# Patient Record
Sex: FEMALE | Race: WHITE | NOT HISPANIC OR LATINO
[De-identification: names, ages, dates, MRNs, and addresses within clinical notes are randomized per-mention and may not be internally consistent; named-entity substitution may affect disease eponyms.]

---

## 2017-11-30 ENCOUNTER — APPOINTMENT (OUTPATIENT)
Dept: PSYCHIATRY | Facility: CLINIC | Age: 22
End: 2017-11-30
Payer: COMMERCIAL

## 2017-11-30 PROCEDURE — 99205 OFFICE O/P NEW HI 60 MIN: CPT

## 2017-11-30 RX ORDER — CEPHALEXIN 250 MG/1
250 CAPSULE ORAL
Qty: 20 | Refills: 0 | Status: COMPLETED | COMMUNITY
Start: 2017-11-07

## 2017-11-30 RX ORDER — ESCITALOPRAM OXALATE 5 MG/1
5 TABLET ORAL
Qty: 30 | Refills: 0 | Status: COMPLETED | COMMUNITY
Start: 2017-06-26

## 2017-11-30 RX ORDER — ESCITALOPRAM OXALATE 10 MG/1
10 TABLET ORAL
Qty: 30 | Refills: 0 | Status: COMPLETED | COMMUNITY
Start: 2017-07-05

## 2017-12-13 ENCOUNTER — APPOINTMENT (OUTPATIENT)
Dept: PSYCHIATRY | Facility: CLINIC | Age: 22
End: 2017-12-13
Payer: COMMERCIAL

## 2017-12-13 PROCEDURE — 99214 OFFICE O/P EST MOD 30 MIN: CPT

## 2017-12-13 RX ORDER — GABAPENTIN 300 MG/1
300 CAPSULE ORAL
Qty: 120 | Refills: 0 | Status: COMPLETED | COMMUNITY
Start: 2017-10-27 | End: 2017-12-13

## 2017-12-27 ENCOUNTER — APPOINTMENT (OUTPATIENT)
Dept: PSYCHIATRY | Facility: CLINIC | Age: 22
End: 2017-12-27
Payer: COMMERCIAL

## 2017-12-27 PROCEDURE — 99214 OFFICE O/P EST MOD 30 MIN: CPT

## 2017-12-27 RX ORDER — MIRTAZAPINE 15 MG/1
15 TABLET, FILM COATED ORAL
Qty: 30 | Refills: 0 | Status: COMPLETED | COMMUNITY
Start: 2017-10-20

## 2017-12-27 RX ORDER — DULOXETINE HYDROCHLORIDE 20 MG/1
20 CAPSULE, DELAYED RELEASE PELLETS ORAL
Qty: 10 | Refills: 0 | Status: COMPLETED | COMMUNITY
Start: 2017-12-13 | End: 2017-12-27

## 2018-01-11 ENCOUNTER — APPOINTMENT (OUTPATIENT)
Dept: PSYCHIATRY | Facility: CLINIC | Age: 23
End: 2018-01-11
Payer: COMMERCIAL

## 2018-01-11 VITALS — DIASTOLIC BLOOD PRESSURE: 71 MMHG | HEART RATE: 90 BPM | SYSTOLIC BLOOD PRESSURE: 108 MMHG

## 2018-01-11 PROCEDURE — 99214 OFFICE O/P EST MOD 30 MIN: CPT

## 2018-01-29 ENCOUNTER — APPOINTMENT (OUTPATIENT)
Dept: PSYCHIATRY | Facility: CLINIC | Age: 23
End: 2018-01-29
Payer: COMMERCIAL

## 2018-01-29 VITALS — HEART RATE: 77 BPM | SYSTOLIC BLOOD PRESSURE: 111 MMHG | DIASTOLIC BLOOD PRESSURE: 77 MMHG

## 2018-01-29 PROCEDURE — 99214 OFFICE O/P EST MOD 30 MIN: CPT

## 2018-01-29 RX ORDER — DULOXETINE HYDROCHLORIDE 30 MG/1
30 CAPSULE, DELAYED RELEASE PELLETS ORAL
Qty: 15 | Refills: 0 | Status: COMPLETED | COMMUNITY
Start: 2017-12-13

## 2018-01-29 RX ORDER — MIRTAZAPINE 30 MG/1
30 TABLET, FILM COATED ORAL
Qty: 30 | Refills: 0 | Status: COMPLETED | COMMUNITY
Start: 2017-10-20 | End: 2018-01-29

## 2018-02-08 ENCOUNTER — APPOINTMENT (OUTPATIENT)
Dept: PSYCHIATRY | Facility: CLINIC | Age: 23
End: 2018-02-08
Payer: COMMERCIAL

## 2018-02-08 PROCEDURE — 99214 OFFICE O/P EST MOD 30 MIN: CPT

## 2018-03-15 ENCOUNTER — APPOINTMENT (OUTPATIENT)
Dept: PSYCHIATRY | Facility: CLINIC | Age: 23
End: 2018-03-15
Payer: COMMERCIAL

## 2018-03-15 PROCEDURE — 99214 OFFICE O/P EST MOD 30 MIN: CPT

## 2018-04-03 ENCOUNTER — APPOINTMENT (OUTPATIENT)
Dept: PSYCHIATRY | Facility: CLINIC | Age: 23
End: 2018-04-03
Payer: COMMERCIAL

## 2018-04-03 VITALS — SYSTOLIC BLOOD PRESSURE: 98 MMHG | DIASTOLIC BLOOD PRESSURE: 61 MMHG | HEART RATE: 96 BPM

## 2018-04-03 PROCEDURE — 99214 OFFICE O/P EST MOD 30 MIN: CPT

## 2018-05-29 ENCOUNTER — APPOINTMENT (OUTPATIENT)
Dept: PSYCHIATRY | Facility: CLINIC | Age: 23
End: 2018-05-29
Payer: COMMERCIAL

## 2018-05-29 PROCEDURE — 99214 OFFICE O/P EST MOD 30 MIN: CPT

## 2018-07-16 ENCOUNTER — APPOINTMENT (OUTPATIENT)
Dept: PSYCHIATRY | Facility: CLINIC | Age: 23
End: 2018-07-16

## 2018-09-30 ENCOUNTER — EMERGENCY (EMERGENCY)
Facility: HOSPITAL | Age: 23
LOS: 1 days | Discharge: HOME | End: 2018-09-30
Admitting: EMERGENCY MEDICINE
Payer: COMMERCIAL

## 2018-09-30 VITALS
HEART RATE: 101 BPM | RESPIRATION RATE: 16 BRPM | TEMPERATURE: 99 F | OXYGEN SATURATION: 100 % | SYSTOLIC BLOOD PRESSURE: 145 MMHG | DIASTOLIC BLOOD PRESSURE: 93 MMHG

## 2018-09-30 DIAGNOSIS — F41.9 ANXIETY DISORDER, UNSPECIFIED: ICD-10-CM

## 2018-09-30 DIAGNOSIS — F33.0 MAJOR DEPRESSIVE DISORDER, RECURRENT, MILD: ICD-10-CM

## 2018-09-30 DIAGNOSIS — R69 ILLNESS, UNSPECIFIED: ICD-10-CM

## 2018-09-30 PROCEDURE — 90792 PSYCH DIAG EVAL W/MED SRVCS: CPT

## 2018-09-30 PROCEDURE — 99282 EMERGENCY DEPT VISIT SF MDM: CPT

## 2018-09-30 NOTE — ED BEHAVIORAL HEALTH ASSESSMENT NOTE - DESCRIPTION
Calm, smiling and in no apparent distress    Vital Signs Last 24 Hrs  T(C): 37 (30 Sep 2018 13:32), Max: 37 (30 Sep 2018 13:32)  T(F): 98.6 (30 Sep 2018 13:32), Max: 98.6 (30 Sep 2018 13:32)  HR: 101 (30 Sep 2018 13:32) (101 - 101)  BP: 145/93 (30 Sep 2018 13:32) (145/93 - 145/93)  BP(mean): --  RR: 16 (30 Sep 2018 13:32) (16 - 16)  SpO2: 100% (30 Sep 2018 13:32) (100% - 100%) Denies Born in [place], lives with family, father  at age 14, attended college but had some financial difficulties and has not been in college in 1.5 years

## 2018-09-30 NOTE — ED BEHAVIORAL HEALTH ASSESSMENT NOTE - CASE SUMMARY
22 y/o  female, with prior history of Depression, Anxiety, Anorexia nervosa, with no past psychiatric admission, prior history of seziure with possible cause of lack of sleep per mother, seasonal allergies and Lexapro allergies, ni history of SIB, SI/HI/I/P, substance use of alcohol, cannabis and LSD with no known complication, who presents from home BIB by mother to be evaluated for depressive symptoms. Patient presents with ongoing depression, anxiety and chronic problems with sleep secondary to noncompliance with treatment. Patient has an outpatient provider in which she stopped attending and she had a relapse of her depressive mood and anxiety since she self-discontinued her medications as of May 2018. At this time, voluntary admission was considered by patient refused and patient does not meet criteria for involutionary admission. Since patient is not at harm to self or others, symptoms can be best addressed with a crisis clinic referral and outpatient care. Alcohol us is concerning as it appears that patient is self-medicating with alcohol and should also be addressed and including in treatment as alcohol use can worse depression.  Agree with the assessment and plan as outline in the NP note. Pt denied any active suicidal ideation, homicidal ideation, auditory/visual hallucinations, CAH, or manic sx. Pt reports that she feel safe for [facility] clinic f/u and pt mother also reported no safety concern regarding discharge. Pt educated the effects of substance use on her mood and risk of negative outcome with ongoing use. Pt also accepted substance tx referral. Pt also educated on the risk suddenly stopping meds w/o discussing it first with her provider. Pt expressed understanding and states that she will consult her MD in the future before making such self changes.

## 2018-09-30 NOTE — ED BEHAVIORAL HEALTH ASSESSMENT NOTE - HPI (INCLUDE ILLNESS QUALITY, SEVERITY, DURATION, TIMING, CONTEXT, MODIFYING FACTORS, ASSOCIATED SIGNS AND SYMPTOMS)
Individual is a 23-year-old-[place] female, who is domiciled with family, single, unemployed, with a self-reported psychiatric history of Depression, Anxiety and Anorexia; with no previous psychiatric inpatient hospitalizations; denies medical history, reports 1 prior episode of seizure for unknown cause; Allergies to Lexapro (vomit) and seasonal allergies; denies history of violence, denies current or history of suicidal/homicidal ideation/thoughts, intent or plan; denies legal history or arrests; prior history of cannabis use, alcohol use and LSD use; who presents to the emergency department BIB mother from home due to feelings of hopelessness.    Patient endorses feeling guilt, hopeless, shame, lack of motivation since Aug of this year triggered by past trauma and self-discontinuation of medications. She reports a chronic history  of inability to sleep, sleeps on average 2 hours with no known cause, reports appetite has been good, denies any changes in weight, denies worthlessness, denies active suicidal ideation but noted past statements of "I don't know how to live past this."     Future goals, plan as per patient? Individual is a 23-year-old-[place] female, who is domiciled with family, single, unemployed, with a self-reported psychiatric history of Depression, Anxiety and Anorexia; with no previous psychiatric inpatient hospitalizations; denies medical history, reports 1 prior episode of seizure for unknown cause; Allergies to Lexapro (vomit) and seasonal allergies; denies history of violence, denies current or history of suicidal/homicidal ideation/thoughts, intent or plan; denies legal history or arrests; prior history of cannabis use, alcohol use and LSD use; who presents to the emergency department BIB mother from home due to feelings of hopelessness.    Patient endorses feeling guilt, hopeless, shame, poor concentration and lack of motivation since Aug of this year triggered by past trauma and self-discontinuation of medications. She reports a chronic history  of inability to sleep, sleeps on average 2 hours with no known cause, reports appetite has been good, denies any changes in weight, denies worthlessness, denies active suicidal ideation but noted past statements of "I don't know how to live past this."     Per collateral from mother: She was recommended to bring patient to be evaluated in ED today by patients' therapist Gissell Gr for feelings of hopelessness. Mother denies any concern for safety in past or current  denying patient making any statement to harm self or others, reports that she has observed patient to be hopeless, "eats junk food" noted long history fo problems with sleep and feels that patient has been drinking alcohol to help with sleep. Mother believes depression of patient was triggered by the untimely death of her father when patient was 14.

## 2018-09-30 NOTE — ED BEHAVIORAL HEALTH ASSESSMENT NOTE - SAFETY PLAN DETAILS
Advised mother and patient on the risk of abruptly stopped medications and probable risk of a recurrence in mood and risks of side effects. May return to [facility] ED if a worsening of symptoms occur. May contact 911 if there is an increased risk of harm to self others.

## 2018-09-30 NOTE — ED ADULT NURSE NOTE - OBJECTIVE STATEMENT
Received pt in  pt bought in by mother c/o depression pt denies Si/Hi/AVh presently safety & comfort measures maintained eval on going.

## 2018-09-30 NOTE — ED BEHAVIORAL HEALTH ASSESSMENT NOTE - DESCRIPTION (FIRST USE, LAST USE, QUANTITY, FREQUENCY, DURATION)
history of cigarette use, denies chronic daily use Reports history starting at the age of 14, last use on 9/28/18. Drinks socially-2 cups of vodka First use at the age of 20, last use about 1 month ago. Denies recreational use First use at the age of 20, lase use Aug 2018. Denies daily use.

## 2018-09-30 NOTE — ED BEHAVIORAL HEALTH ASSESSMENT NOTE - RISK ASSESSMENT
Risk factors include psychiatric diagnoses, chronic hopelessness, unemployed, lack of social support, Impulsiveness, young adult    Protective factors include no suicide attempts, no violence history, supportive housing, no access to guns, supportive family, willingness to seek help, no active suicidal ideation, no homicidal ideation, reality testing, hopefulness for future.

## 2018-09-30 NOTE — ED PROVIDER NOTE - MEDICAL DECISION MAKING DETAILS
24 y/o F hx MDD, Anxiety D/O  Medical evaluation performed. There is no clinical evidence of intoxication or any acute medical problem requiring immediate intervention. Psychiatric consultation called, recommend following up with Outpatient Psychiatrist.  D/C home in company of mother.

## 2018-09-30 NOTE — ED PROVIDER NOTE - CARE PLAN
Principal Discharge DX:	MDD (major depressive disorder), recurrent episode, mild  Secondary Diagnosis:	Anxiety disorder, unspecified type

## 2018-09-30 NOTE — ED ADULT NURSE REASSESSMENT NOTE - NS ED NURSE REASSESS COMMENT FT1
pt calm & cooperative d/c by NP verbalizing understanding of d/c instructions pt denies Si/Hi/AVh presently.

## 2018-09-30 NOTE — ED BEHAVIORAL HEALTH ASSESSMENT NOTE - NS ED BHA MSE SPEECH RATE
Check here if all serologies below were negative, non-reactive or immune. Otherwise select appropriate status.
Normal

## 2018-09-30 NOTE — ED BEHAVIORAL HEALTH ASSESSMENT NOTE - OTHER PAST PSYCHIATRIC HISTORY (INCLUDE DETAILS REGARDING ONSET, COURSE OF ILLNESS, INPATIENT/OUTPATIENT TREATMENT)
Reports history of care with outpatient psychiatrist Dr. [phone] from Nov 2017-May 2018. Patient reports that she missed one follow-up appointment in May and never returned ongoing care due to patient attending school in [place]. Reports history of care with outpatient psychiatrist Dr. [phone] from Nov 2017-May 2018. Patient reports that she missed one follow-up appointment in May and never returned ongoing care due to patient attending school in [place].    7/09/2017- [facility]-Adverse reaction to Lexapro  1/25/18-Seizure  2/1/18-Seen by Dr. Batista neurologist  2/21/18-Had an EEG to rule out seizures with negative results for recurrent seizures  5/28/18- History of UTI and Low K+

## 2018-09-30 NOTE — ED BEHAVIORAL HEALTH ASSESSMENT NOTE - SUMMARY
24 y/o  female, with prior history of Depression, Anxiety, Anorexia nervosa, with no past psychiatric admission, prior history of seziure with possible cause of lack of sleep per mother, seasonal allergies and Lexapro allergies, ni history of SIB, SI/HI/I/P, substance use of alcohol, cannabis and LSD with no known complication, who presents from home BIB by mother to be evaluated for depressive symptoms.     Patient presents with ongoing depression, anxiety and chronic problems with sleep secondary to noncompliance with treatment. Patient has an outpatient provider in which she stopped attending and she had a relapse of her depressive mood and anxiety since she self-discontinued her medications as of May 2018. At this time, voluntary admission was considered by patient refused and patient does not meet criteria for involutionary admission. Since patient is not at harm to self or others, symptoms can be best addressed with a crisis clinic referral and outpatient care. Alcohol us is concerning as it appears that patient is self-medicating with alcohol and should also be addressed and including in treatment as alcohol use can worse depression.

## 2018-09-30 NOTE — ED PROVIDER NOTE - OBJECTIVE STATEMENT
24 y/o F hx MDD, Anxiety D/O BIB mother w c/o depressive symptoms, episodic anxiousness and  insomnia. Admits to stop all her medications several weeks ago.   States that she feels overwhelmed and functioning below her baseline.  No evidence of physical injuries, broken skin or deformities.  Denies falling, punching or kicking any objects. Denies SI/HI/AH/VH. Denies pain, SOB , fever, chills, chest/ abdominal discomfort.  Denies recent use of  alcohol or illicit drugs.  [facility] - 7/2018

## 2018-09-30 NOTE — ED BEHAVIORAL HEALTH ASSESSMENT NOTE - PAST PSYCHOTROPIC MEDICATION
Cymbalta-most helpful, Lexapro, Trazodone, Remeron, Neurontin Cymbalta 60mg-most helpful, Lexapro 5mg, Trazodone 100mg, Remeron 15mg, Neurontin 300 TID

## 2018-09-30 NOTE — ED BEHAVIORAL HEALTH ASSESSMENT NOTE - DIFFERENTIAL
MDD, recurrent with anxious distress vs Persistent depressive disorder with anxious distress, Alcohol use disorder, EMMA, Cluster B personality disorder (history of fear of abandonment)

## 2018-10-01 ENCOUNTER — APPOINTMENT (OUTPATIENT)
Dept: PSYCHIATRY | Facility: CLINIC | Age: 23
End: 2018-10-01
Payer: COMMERCIAL

## 2018-10-01 PROCEDURE — 99214 OFFICE O/P EST MOD 30 MIN: CPT

## 2018-10-01 RX ORDER — DULOXETINE HYDROCHLORIDE 60 MG/1
60 CAPSULE, DELAYED RELEASE PELLETS ORAL
Qty: 30 | Refills: 1 | Status: DISCONTINUED | COMMUNITY
Start: 2017-12-13 | End: 2018-10-01

## 2018-10-01 RX ORDER — TRAZODONE HYDROCHLORIDE 50 MG/1
50 TABLET ORAL
Qty: 60 | Refills: 1 | Status: DISCONTINUED | COMMUNITY
Start: 2018-01-29 | End: 2018-10-01

## 2018-10-01 RX ORDER — DULOXETINE HYDROCHLORIDE 30 MG/1
30 CAPSULE, DELAYED RELEASE PELLETS ORAL
Qty: 30 | Refills: 1 | Status: DISCONTINUED | COMMUNITY
Start: 2018-03-15 | End: 2018-10-01

## 2018-10-11 ENCOUNTER — APPOINTMENT (OUTPATIENT)
Dept: PSYCHIATRY | Facility: CLINIC | Age: 23
End: 2018-10-11
Payer: COMMERCIAL

## 2018-10-11 DIAGNOSIS — F39 UNSPECIFIED MOOD [AFFECTIVE] DISORDER: ICD-10-CM

## 2018-10-11 DIAGNOSIS — F32.9 MAJOR DEPRESSIVE DISORDER, SINGLE EPISODE, UNSPECIFIED: ICD-10-CM

## 2018-10-11 PROBLEM — F41.9 ANXIETY DISORDER, UNSPECIFIED: Chronic | Status: ACTIVE | Noted: 2018-09-30

## 2018-10-11 PROCEDURE — 99214 OFFICE O/P EST MOD 30 MIN: CPT

## 2018-10-12 PROBLEM — F39 MOOD DISORDER: Noted: 2018-10-01

## 2018-10-12 PROBLEM — F32.9 DEPRESSION: Noted: 2017-11-30

## 2018-11-05 ENCOUNTER — APPOINTMENT (OUTPATIENT)
Dept: PSYCHIATRY | Facility: CLINIC | Age: 23
End: 2018-11-05
Payer: COMMERCIAL

## 2018-11-05 PROCEDURE — 99214 OFFICE O/P EST MOD 30 MIN: CPT

## 2018-11-26 ENCOUNTER — APPOINTMENT (OUTPATIENT)
Dept: PSYCHIATRY | Facility: CLINIC | Age: 23
End: 2018-11-26
Payer: COMMERCIAL

## 2018-11-26 PROCEDURE — 99214 OFFICE O/P EST MOD 30 MIN: CPT

## 2018-11-26 RX ORDER — LAMOTRIGINE 25 MG/1
25 TABLET ORAL
Qty: 21 | Refills: 0 | Status: COMPLETED | COMMUNITY
Start: 2018-10-11 | End: 2018-11-26

## 2018-11-26 RX ORDER — LAMOTRIGINE 150 MG/1
150 TABLET ORAL DAILY
Qty: 30 | Refills: 0 | Status: ACTIVE | COMMUNITY
Start: 2018-11-05 | End: 1900-01-01

## 2018-12-03 ENCOUNTER — CHART COPY (OUTPATIENT)
Age: 23
End: 2018-12-03

## 2018-12-04 ENCOUNTER — APPOINTMENT (OUTPATIENT)
Dept: PSYCHIATRY | Facility: CLINIC | Age: 23
End: 2018-12-04
Payer: COMMERCIAL

## 2018-12-04 PROCEDURE — 99214 OFFICE O/P EST MOD 30 MIN: CPT

## 2018-12-04 RX ORDER — QUETIAPINE FUMARATE 25 MG/1
25 TABLET ORAL
Qty: 30 | Refills: 1 | Status: DISCONTINUED | COMMUNITY
Start: 2018-10-01 | End: 2018-12-04

## 2018-12-04 RX ORDER — LURASIDONE HYDROCHLORIDE 20 MG/1
20 TABLET, FILM COATED ORAL
Qty: 30 | Refills: 0 | Status: ACTIVE | COMMUNITY
Start: 2018-12-04 | End: 1900-01-01

## 2018-12-10 ENCOUNTER — APPOINTMENT (OUTPATIENT)
Dept: PSYCHIATRY | Facility: CLINIC | Age: 23
End: 2018-12-10
Payer: COMMERCIAL

## 2018-12-10 PROCEDURE — 99214 OFFICE O/P EST MOD 30 MIN: CPT

## 2018-12-12 NOTE — ED ADULT NURSE NOTE - CHIEF COMPLAINT
Problem: Falls - Risk of:  Goal: Will remain free from falls  Will remain free from falls   Outcome: Met This Shift  Patient free of falls this shift. All precautions in place. Nonskid socks for prevention. Bed in lowest/locked position with alarm on. Call light and bedside table within easy reach. Patient instructed on use of the call light and fall precautions. Hourly rounding for safety. Will monitor. Problem: Safety:  Goal: Ability to remain free from injury will improve  Ability to remain free from injury will improve   Outcome: Met This Shift  Patient free of seizures this shift. Seizure pads to beds rails, avasys in use. Patient is on PO lamictal.  Will continue with seizure precautions. Problem: Pain:  Goal: Control of chronic pain  Control of chronic pain   Outcome: Met This Shift  PRN percocet given for foot pain. No adverse effects noted, patient satisfied, will monitor. The patient is a 23y Female complaining of

## 2018-12-18 ENCOUNTER — APPOINTMENT (OUTPATIENT)
Dept: PSYCHIATRY | Facility: CLINIC | Age: 23
End: 2018-12-18
Payer: COMMERCIAL

## 2018-12-18 DIAGNOSIS — G47.00 INSOMNIA, UNSPECIFIED: ICD-10-CM

## 2018-12-18 DIAGNOSIS — F31.81 BIPOLAR II DISORDER: ICD-10-CM

## 2018-12-18 DIAGNOSIS — F41.1 GENERALIZED ANXIETY DISORDER: ICD-10-CM

## 2018-12-18 PROCEDURE — 99214 OFFICE O/P EST MOD 30 MIN: CPT

## 2018-12-19 ENCOUNTER — OUTPATIENT (OUTPATIENT)
Dept: OUTPATIENT SERVICES | Facility: HOSPITAL | Age: 23
LOS: 1 days | Discharge: PSYCHIATRIC OR SUBSTANCE USE FACILITY | End: 2018-12-19
Payer: COMMERCIAL

## 2018-12-20 LAB
ALBUMIN SERPL ELPH-MCNC: 4.2 G/DL — SIGNIFICANT CHANGE UP (ref 3.3–5)
ALP SERPL-CCNC: 108 U/L — SIGNIFICANT CHANGE UP (ref 40–120)
ALT FLD-CCNC: 17 U/L — SIGNIFICANT CHANGE UP (ref 4–33)
AST SERPL-CCNC: 28 U/L — SIGNIFICANT CHANGE UP (ref 4–32)
BASOPHILS # BLD AUTO: 0.1 K/UL — SIGNIFICANT CHANGE UP (ref 0–0.2)
BASOPHILS NFR BLD AUTO: 1 % — SIGNIFICANT CHANGE UP (ref 0–2)
BILIRUB SERPL-MCNC: 0.7 MG/DL — SIGNIFICANT CHANGE UP (ref 0.2–1.2)
BUN SERPL-MCNC: 8 MG/DL — SIGNIFICANT CHANGE UP (ref 7–23)
CALCIUM SERPL-MCNC: 8.9 MG/DL — SIGNIFICANT CHANGE UP (ref 8.4–10.5)
CHLORIDE SERPL-SCNC: 101 MMOL/L — SIGNIFICANT CHANGE UP (ref 98–107)
CHOLEST SERPL-MCNC: 159 MG/DL — SIGNIFICANT CHANGE UP (ref 120–199)
CO2 SERPL-SCNC: 24 MMOL/L — SIGNIFICANT CHANGE UP (ref 22–31)
CREAT SERPL-MCNC: 0.66 MG/DL — SIGNIFICANT CHANGE UP (ref 0.5–1.3)
EOSINOPHIL # BLD AUTO: 0.1 K/UL — SIGNIFICANT CHANGE UP (ref 0–0.5)
EOSINOPHIL NFR BLD AUTO: 1 % — SIGNIFICANT CHANGE UP (ref 0–6)
GLUCOSE SERPL-MCNC: 81 MG/DL — SIGNIFICANT CHANGE UP (ref 70–99)
HBA1C BLD-MCNC: 4.2 % — SIGNIFICANT CHANGE UP (ref 4–5.6)
HCG SERPL-ACNC: < 5 MIU/ML — SIGNIFICANT CHANGE UP
HCT VFR BLD CALC: 45.7 % — HIGH (ref 34.5–45)
HDLC SERPL-MCNC: 72 MG/DL — HIGH (ref 45–65)
HGB BLD-MCNC: 15.6 G/DL — HIGH (ref 11.5–15.5)
IMM GRANULOCYTES # BLD AUTO: 0.04 # — SIGNIFICANT CHANGE UP
IMM GRANULOCYTES NFR BLD AUTO: 0.4 % — SIGNIFICANT CHANGE UP (ref 0–1.5)
LIPID PNL WITH DIRECT LDL SERPL: 84 MG/DL — SIGNIFICANT CHANGE UP
LYMPHOCYTES # BLD AUTO: 1.34 K/UL — SIGNIFICANT CHANGE UP (ref 1–3.3)
LYMPHOCYTES # BLD AUTO: 13.7 % — SIGNIFICANT CHANGE UP (ref 13–44)
MCHC RBC-ENTMCNC: 34.1 % — SIGNIFICANT CHANGE UP (ref 32–36)
MCHC RBC-ENTMCNC: 35.1 PG — HIGH (ref 27–34)
MCV RBC AUTO: 102.9 FL — HIGH (ref 80–100)
MONOCYTES # BLD AUTO: 0.41 K/UL — SIGNIFICANT CHANGE UP (ref 0–0.9)
MONOCYTES NFR BLD AUTO: 4.2 % — SIGNIFICANT CHANGE UP (ref 2–14)
NEUTROPHILS # BLD AUTO: 7.79 K/UL — HIGH (ref 1.8–7.4)
NEUTROPHILS NFR BLD AUTO: 79.7 % — HIGH (ref 43–77)
NRBC # FLD: 0 — SIGNIFICANT CHANGE UP
PLATELET # BLD AUTO: 257 K/UL — SIGNIFICANT CHANGE UP (ref 150–400)
PMV BLD: 10.5 FL — SIGNIFICANT CHANGE UP (ref 7–13)
POTASSIUM SERPL-MCNC: 3.7 MMOL/L — SIGNIFICANT CHANGE UP (ref 3.5–5.3)
POTASSIUM SERPL-SCNC: 3.7 MMOL/L — SIGNIFICANT CHANGE UP (ref 3.5–5.3)
PROT SERPL-MCNC: 7.5 G/DL — SIGNIFICANT CHANGE UP (ref 6–8.3)
RBC # BLD: 4.44 M/UL — SIGNIFICANT CHANGE UP (ref 3.8–5.2)
RBC # FLD: 12.9 % — SIGNIFICANT CHANGE UP (ref 10.3–14.5)
SODIUM SERPL-SCNC: 139 MMOL/L — SIGNIFICANT CHANGE UP (ref 135–145)
TRIGL SERPL-MCNC: 102 MG/DL — SIGNIFICANT CHANGE UP (ref 10–149)
TSH SERPL-MCNC: 1.07 UIU/ML — SIGNIFICANT CHANGE UP (ref 0.27–4.2)
WBC # BLD: 9.78 K/UL — SIGNIFICANT CHANGE UP (ref 3.8–10.5)
WBC # FLD AUTO: 9.78 K/UL — SIGNIFICANT CHANGE UP (ref 3.8–10.5)

## 2018-12-24 LAB
APPEARANCE UR: SIGNIFICANT CHANGE UP
BACTERIA # UR AUTO: SIGNIFICANT CHANGE UP
BASOPHILS # BLD AUTO: 0.07 K/UL — SIGNIFICANT CHANGE UP (ref 0–0.2)
BASOPHILS NFR BLD AUTO: 1 % — SIGNIFICANT CHANGE UP (ref 0–2)
BILIRUB UR-MCNC: NEGATIVE — SIGNIFICANT CHANGE UP
BLOOD UR QL VISUAL: SIGNIFICANT CHANGE UP
COLOR SPEC: YELLOW — SIGNIFICANT CHANGE UP
EOSINOPHIL # BLD AUTO: 0.06 K/UL — SIGNIFICANT CHANGE UP (ref 0–0.5)
EOSINOPHIL NFR BLD AUTO: 0.9 % — SIGNIFICANT CHANGE UP (ref 0–6)
GLUCOSE UR-MCNC: NEGATIVE — SIGNIFICANT CHANGE UP
HCG SERPL-ACNC: < 5 MIU/ML — SIGNIFICANT CHANGE UP
HCT VFR BLD CALC: 44.6 % — SIGNIFICANT CHANGE UP (ref 34.5–45)
HGB BLD-MCNC: 15.4 G/DL — SIGNIFICANT CHANGE UP (ref 11.5–15.5)
IMM GRANULOCYTES # BLD AUTO: 0.05 # — SIGNIFICANT CHANGE UP
IMM GRANULOCYTES NFR BLD AUTO: 0.7 % — SIGNIFICANT CHANGE UP (ref 0–1.5)
KETONES UR-MCNC: NEGATIVE — SIGNIFICANT CHANGE UP
LEUKOCYTE ESTERASE UR-ACNC: SIGNIFICANT CHANGE UP
LYMPHOCYTES # BLD AUTO: 1.1 K/UL — SIGNIFICANT CHANGE UP (ref 1–3.3)
LYMPHOCYTES # BLD AUTO: 16.2 % — SIGNIFICANT CHANGE UP (ref 13–44)
MCHC RBC-ENTMCNC: 34.5 % — SIGNIFICANT CHANGE UP (ref 32–36)
MCHC RBC-ENTMCNC: 34.5 PG — HIGH (ref 27–34)
MCV RBC AUTO: 100 FL — SIGNIFICANT CHANGE UP (ref 80–100)
MONOCYTES # BLD AUTO: 0.47 K/UL — SIGNIFICANT CHANGE UP (ref 0–0.9)
MONOCYTES NFR BLD AUTO: 6.9 % — SIGNIFICANT CHANGE UP (ref 2–14)
NEUTROPHILS # BLD AUTO: 5.03 K/UL — SIGNIFICANT CHANGE UP (ref 1.8–7.4)
NEUTROPHILS NFR BLD AUTO: 74.3 % — SIGNIFICANT CHANGE UP (ref 43–77)
NITRITE UR-MCNC: NEGATIVE — SIGNIFICANT CHANGE UP
NRBC # FLD: 0 — SIGNIFICANT CHANGE UP
PH UR: 6.5 — SIGNIFICANT CHANGE UP (ref 5–8)
PLATELET # BLD AUTO: 253 K/UL — SIGNIFICANT CHANGE UP (ref 150–400)
PMV BLD: 10.6 FL — SIGNIFICANT CHANGE UP (ref 7–13)
PROT UR-MCNC: 20 — SIGNIFICANT CHANGE UP
RBC # BLD: 4.46 M/UL — SIGNIFICANT CHANGE UP (ref 3.8–5.2)
RBC # FLD: 13.1 % — SIGNIFICANT CHANGE UP (ref 10.3–14.5)
RBC CASTS # UR COMP ASSIST: SIGNIFICANT CHANGE UP (ref 0–?)
SP GR SPEC: 1.01 — SIGNIFICANT CHANGE UP (ref 1–1.04)
SQUAMOUS # UR AUTO: SIGNIFICANT CHANGE UP
UROBILINOGEN FLD QL: NORMAL — SIGNIFICANT CHANGE UP
WBC # BLD: 6.78 K/UL — SIGNIFICANT CHANGE UP (ref 3.8–10.5)
WBC # FLD AUTO: 6.78 K/UL — SIGNIFICANT CHANGE UP (ref 3.8–10.5)
WBC UR QL: >50 — HIGH (ref 0–?)

## 2018-12-27 DIAGNOSIS — F31.81 BIPOLAR II DISORDER: ICD-10-CM

## 2018-12-28 LAB
AMPHET UR-MCNC: NEGATIVE — SIGNIFICANT CHANGE UP
BARBITURATES UR SCN-MCNC: NEGATIVE — SIGNIFICANT CHANGE UP
BENZODIAZ UR-MCNC: NEGATIVE — SIGNIFICANT CHANGE UP
CANNABINOIDS UR-MCNC: POSITIVE — SIGNIFICANT CHANGE UP
COCAINE METAB.OTHER UR-MCNC: NEGATIVE — SIGNIFICANT CHANGE UP
METHADONE UR-MCNC: NEGATIVE — SIGNIFICANT CHANGE UP
OPIATES UR-MCNC: NEGATIVE — SIGNIFICANT CHANGE UP
OXYCODONE UR-MCNC: NEGATIVE — SIGNIFICANT CHANGE UP
PCP UR-MCNC: NEGATIVE — SIGNIFICANT CHANGE UP

## 2019-01-16 ENCOUNTER — OUTPATIENT (OUTPATIENT)
Dept: OUTPATIENT SERVICES | Facility: HOSPITAL | Age: 24
LOS: 1 days | Discharge: PSYCHIATRIC OR SUBSTANCE USE FACILITY | End: 2019-01-16
Payer: COMMERCIAL

## 2019-01-17 DIAGNOSIS — F41.1 GENERALIZED ANXIETY DISORDER: ICD-10-CM

## 2019-01-17 DIAGNOSIS — F31.81 BIPOLAR II DISORDER: ICD-10-CM

## 2019-01-17 DIAGNOSIS — F60.3 BORDERLINE PERSONALITY DISORDER: ICD-10-CM

## 2020-08-20 ENCOUNTER — TRANSCRIPTION ENCOUNTER (OUTPATIENT)
Age: 25
End: 2020-08-20

## 2021-01-28 PROCEDURE — 99442: CPT

## 2021-03-01 PROCEDURE — 99443: CPT

## 2021-03-26 PROCEDURE — 99442: CPT

## 2021-04-23 PROCEDURE — 99442: CPT

## 2021-05-21 PROCEDURE — 99442: CPT

## 2021-06-22 PROCEDURE — 99214 OFFICE O/P EST MOD 30 MIN: CPT | Mod: 95

## 2021-06-24 ENCOUNTER — TRANSCRIPTION ENCOUNTER (OUTPATIENT)
Age: 26
End: 2021-06-24

## 2021-07-26 PROCEDURE — 99214 OFFICE O/P EST MOD 30 MIN: CPT | Mod: 95

## 2021-09-10 PROCEDURE — 99214 OFFICE O/P EST MOD 30 MIN: CPT | Mod: 95

## 2021-10-08 PROCEDURE — 99214 OFFICE O/P EST MOD 30 MIN: CPT | Mod: 95

## 2021-11-05 PROCEDURE — 99214 OFFICE O/P EST MOD 30 MIN: CPT | Mod: 95

## 2021-11-29 PROCEDURE — 99214 OFFICE O/P EST MOD 30 MIN: CPT | Mod: 95

## 2021-12-01 ENCOUNTER — OUTPATIENT (OUTPATIENT)
Dept: OUTPATIENT SERVICES | Facility: HOSPITAL | Age: 26
LOS: 1 days | Discharge: HOME | End: 2021-12-01
Payer: COMMERCIAL

## 2021-12-13 PROCEDURE — 99214 OFFICE O/P EST MOD 30 MIN: CPT

## 2021-12-21 PROCEDURE — 99214 OFFICE O/P EST MOD 30 MIN: CPT

## 2021-12-28 PROCEDURE — 99214 OFFICE O/P EST MOD 30 MIN: CPT

## 2022-01-05 PROCEDURE — 99214 OFFICE O/P EST MOD 30 MIN: CPT | Mod: 95

## 2022-01-06 DIAGNOSIS — F50.9 EATING DISORDER, UNSPECIFIED: ICD-10-CM

## 2022-01-06 DIAGNOSIS — F60.3 BORDERLINE PERSONALITY DISORDER: ICD-10-CM

## 2022-01-06 DIAGNOSIS — F33.9 MAJOR DEPRESSIVE DISORDER, RECURRENT, UNSPECIFIED: ICD-10-CM

## 2022-01-11 PROCEDURE — 99214 OFFICE O/P EST MOD 30 MIN: CPT

## 2022-01-18 ENCOUNTER — OUTPATIENT (OUTPATIENT)
Dept: OUTPATIENT SERVICES | Facility: HOSPITAL | Age: 27
LOS: 1 days | Discharge: LEFT BEFORE DISCHARGE | End: 2022-01-18
Payer: COMMERCIAL

## 2022-01-19 PROCEDURE — 90791 PSYCH DIAGNOSTIC EVALUATION: CPT | Mod: 95

## 2022-02-16 DIAGNOSIS — F60.3 BORDERLINE PERSONALITY DISORDER: ICD-10-CM

## 2022-02-16 DIAGNOSIS — F50.9 EATING DISORDER, UNSPECIFIED: ICD-10-CM

## 2022-02-16 DIAGNOSIS — F41.1 GENERALIZED ANXIETY DISORDER: ICD-10-CM

## 2022-02-16 DIAGNOSIS — F33.9 MAJOR DEPRESSIVE DISORDER, RECURRENT, UNSPECIFIED: ICD-10-CM

## 2022-02-25 PROCEDURE — 99214 OFFICE O/P EST MOD 30 MIN: CPT | Mod: 95

## 2022-03-18 PROCEDURE — 99214 OFFICE O/P EST MOD 30 MIN: CPT | Mod: 95

## 2022-03-24 ENCOUNTER — EMERGENCY (EMERGENCY)
Facility: HOSPITAL | Age: 27
LOS: 1 days | Discharge: HOME | End: 2022-03-24
Attending: EMERGENCY MEDICINE | Admitting: EMERGENCY MEDICINE
Payer: COMMERCIAL

## 2022-03-24 VITALS
HEART RATE: 110 BPM | TEMPERATURE: 99 F | SYSTOLIC BLOOD PRESSURE: 127 MMHG | OXYGEN SATURATION: 99 % | RESPIRATION RATE: 18 BRPM | DIASTOLIC BLOOD PRESSURE: 90 MMHG

## 2022-03-24 DIAGNOSIS — F60.3 BORDERLINE PERSONALITY DISORDER: ICD-10-CM

## 2022-03-24 DIAGNOSIS — F32.9 MAJOR DEPRESSIVE DISORDER, SINGLE EPISODE, UNSPECIFIED: ICD-10-CM

## 2022-03-24 DIAGNOSIS — F10.10 ALCOHOL ABUSE, UNCOMPLICATED: ICD-10-CM

## 2022-03-24 PROCEDURE — 99284 EMERGENCY DEPT VISIT MOD MDM: CPT

## 2022-03-24 PROCEDURE — 90792 PSYCH DIAG EVAL W/MED SRVCS: CPT

## 2022-03-24 NOTE — ED PROVIDER NOTE - PATIENT PORTAL LINK FT
You can access the FollowMyHealth Patient Portal offered by [facility] by registering at the following website: [email or web address]. By joining [facility]’s FollowMyHealth portal, you will also be able to view your health information using other applications (apps) compatible with our system.

## 2022-03-24 NOTE — ED BEHAVIORAL HEALTH ASSESSMENT NOTE - OTHER PAST PSYCHIATRIC HISTORY (INCLUDE DETAILS REGARDING ONSET, COURSE OF ILLNESS, INPATIENT/OUTPATIENT TREATMENT)
Reports history of care with outpatient psychiatrist Dr. [phone] from Nov 2017-May 2018. Patient reports that she missed one follow-up appointment in May and never returned ongoing care due to patient attending school in [place].    7/09/2017- [facility]-Adverse reaction to Lexapro  1/25/18-Seizure  2/1/18-Seen by Dr. Batista neurologist  2/21/18-Had an EEG to rule out seizures with negative results for recurrent seizures  5/28/18- History of UTI and Low K+ psychiatric history of Depression, Anxiety, Borderline personality disorder and Anorexia- in . Outpatient PACE program with Dr. Mendez & Trina Maxwell     7/09/2017- [facility]-Adverse reaction to Lexapro  1/25/18-Seizure  2/1/18-Seen by Dr. Batista neurologist  2/21/18-Had an EEG to rule out seizures with negative results for recurrent seizures  5/28/18- History of UTI and Low K+

## 2022-03-24 NOTE — ED BEHAVIORAL HEALTH ASSESSMENT NOTE - SAFETY PLAN DISCUSSED WITH:
Appointment scheduled 2/10/22.  
Medication already filled in another encounter.   
Message left for Dad to call and schedule med check.   
Refill Request on:     Disp Refills Start End    buPROPion XL (WELLBUTRIN XL) 150 MG 24 hr tablet 30 tablet 3 9/9/2021     Sig - Route: Take 1 tablet by mouth daily. - Oral    Sent to pharmacy as: buPROPion HCl ER (XL) 150 MG Oral Tablet Extended Release 24 Hour (WELLBUTRIN XL)    Class: Eprescribe      Last OV: 9/9/21  Next OV: none  PDMP reviewed: no     Patient was supposed to follow up in December; please schedule med check prior to refilling medication.   
Patient/Family

## 2022-03-24 NOTE — ED BEHAVIORAL HEALTH ASSESSMENT NOTE - NSCURPASTPSYDX_PSY_ALL_CORE
Mood disorder/Alcohol/Substance Use disorders Mood disorder/Eating disorder/Alcohol/Substance Use disorders

## 2022-03-24 NOTE — ED ADULT TRIAGE NOTE - CHIEF COMPLAINT QUOTE
Pt brought in by EMS from work after telling her boss she was having " dark thoughts". Pt states she has chronic passive suicidal thoughts. Pt calm and cooperative in triage.

## 2022-03-24 NOTE — ED BEHAVIORAL HEALTH ASSESSMENT NOTE - HPI (INCLUDE ILLNESS QUALITY, SEVERITY, DURATION, TIMING, CONTEXT, MODIFYING FACTORS, ASSOCIATED SIGNS AND SYMPTOMS)
Individual is a 23-year-old-[place] female, who is domiciled with family, single, unemployed, with a self-reported psychiatric history of Depression, Anxiety and Anorexia; with no previous psychiatric inpatient hospitalizations; denies medical history, reports 1 prior episode of seizure for unknown cause; Allergies to Lexapro (vomit) and seasonal allergies; denies history of violence, denies current or history of suicidal/homicidal ideation/thoughts, intent or plan; denies legal history or arrests; prior history of cannabis use, alcohol use and LSD use; who presents to the emergency department BIB mother from home due to feelings of hopelessness.    Patient endorses feeling guilt, hopeless, shame, poor concentration and lack of motivation since Aug of this year triggered by past trauma and self-discontinuation of medications. She reports a chronic history  of inability to sleep, sleeps on average 2 hours with no known cause, reports appetite has been good, denies any changes in weight, denies worthlessness, denies active suicidal ideation but noted past statements of "I don't know how to live past this."     Per collateral from mother: She was recommended to bring patient to be evaluated in ED today by patients' therapist Gissell Gr for feelings of hopelessness. Mother denies any concern for safety in past or current  denying patient making any statement to harm self or others, reports that she has observed patient to be hopeless, "eats junk food" noted long history fo problems with sleep and feels that patient has been drinking alcohol to help with sleep. Mother believes depression of patient was triggered by the untimely death of her father when patient was 14. Patient is a 26-year-old[place] female, who is domiciled with family, single, employed, with a psychiatric history of Depression, Anxiety, Borderline personality disorder and Anorexia- in ; with no previous psychiatric inpatient hospitalizations, no history of suicide attempts. denies history of violence, denies legal history or arrests; prior history of cannabis use/LSD use, habitual alcohol use 3x weekly. No known history of detox/rehab/withdrawal symptoms. No PMH- reports 1 prior episode of seizure for unknown cause; Allergies to Lexapro (vomit) and seasonal allergies. BIBA referred by work colleagues for suicidal ideation.     Patient reports she came to the ED because she was having "dark thoughts." She described her dark thoughts as chronic passive suicidal ideation. Patient stated she always has thoughts about she doesn't know how to go on and that she haters herself. She adamantly denies active SI/plan/intent. Patient discussed an incident this past weekend when she was out drinking with friends and said some things she regretted. She reports she felt like her friends didn't like her. She cannot recall what she said exactly. She also discussed how she does not like her job and dreads going to it everyday. She stated it wasn't what she thought it was and doesn't feel she is helping people.     She stated since this incident had been having passive suicidal ideation, feeling lonely  and called her employee assistance line, a suicide hotline and her mother. She stated she called these people because she felt lonely and wanted someone to talk to. She denied active SI/plan/intent. She said she just wanted to speak to someone. Yesterday her boss let her work from home after her therapy appt. Today she was at work and she was speaking to her boss telling her how she felt and was speaking to her work colleagues. She stated one of her colleagues said that she would worry if she didn't let her call 911. EMS came and patient spoke to her therapist and EMS said she had to go to ED despite speaking with therapist.    Patient endorsed chronic depression, feelings of worthlessness, anxiety and passive suicidal ideation. She showers 2x weekly  since september because she reports showering isn't distracting enough and she does other things to distract herself. She endorses trigger to worsening depressive symptoms including relationship breakup in September after dating 1 year, disliking her job and intermittent relationship issues.     Patient denies any hallucinations, does not report any delusional thought content, denies thought insertion/withdrawal, denies referential thought processes & is not paranoid on interview. Pt is linear, logical, organized and well related. Patient does not report nor exhibit any signs of shannan, including irritable or elevated mood, grandiosity, pressured speech, risk-taking behaviors, increase in productivity or agitation. Patient denies sleep disturbances, preoccupation with death or feelings of guilt. Patient adamantly denies active SI, intent or plan; denies any HI, violent thoughts.     See  note for collateral information. Patient is a 26-year-old- female, who is domiciled with family, single, employed, with a psychiatric history of Depression, Anxiety, Borderline personality disorder and Anorexia- in ; with no previous psychiatric inpatient hospitalizations, no history of suicide attempts. denies history of violence, denies legal history or arrests; prior history of cannabis use/LSD use, habitual alcohol use 3x weekly. No known history of detox/rehab/withdrawal symptoms. No PMH- reports 1 prior episode of seizure for unknown cause; Allergies to Lexapro (vomit) and seasonal allergies. BIBA referred by work colleagues for suicidal ideation.     Patient reports she came to the ED because she was having "dark thoughts." She described her dark thoughts as chronic passive suicidal ideation. Patient stated she always has thoughts about she doesn't know how to go on and that she hates herself. She adamantly denies active SI/plan/intent. Patient discussed an incident this past weekend when she was out drinking with friends and said some things she regretted. She reports she felt like her friends didn't like her. She cannot recall what she said exactly. She also discussed how she does not like her job and dreads going to it everyday. She stated it wasn't what she thought it was and doesn't feel she is helping people.     She stated since this incident had been having passive suicidal ideation, feeling lonely  and called her employee assistance line, a suicide hotline and her mother. She stated she called these people because she felt lonely and wanted someone to talk to. She denied active SI/plan/intent. She said she just wanted to speak to someone. Yesterday her boss let her work from home after her therapy appt. Today she was at work and she was speaking to her boss telling her how she felt and was speaking to her work colleagues. She stated one of her colleagues said that she would worry if she didn't let her call 911. EMS came and patient spoke to her therapist and EMS said she had to go to ED despite speaking with therapist.    Patient endorsed chronic depression, feelings of worthlessness, anxiety and passive suicidal ideation. She showers 2x weekly  since september because she reports showering isn't distracting enough and she does other things to distract herself. She endorses trigger to worsening depressive symptoms including relationship breakup in September after dating 1 year, disliking her job and intermittent relationship issues.     Patient denies any hallucinations, does not report any delusional thought content, denies thought insertion/withdrawal, denies referential thought processes & is not paranoid on interview. Pt is linear, logical, organized and well related. Patient does not report nor exhibit any signs of shannan, including irritable or elevated mood, grandiosity, pressured speech, risk-taking behaviors, increase in productivity or agitation. Patient denies sleep disturbances, preoccupation with death or feelings of guilt. Patient adamantly denies active SI, intent or plan; denies any HI, violent thoughts.     See  note for collateral information.

## 2022-03-24 NOTE — ED PROVIDER NOTE - OBJECTIVE STATEMENT
26F h/o depression/anxiety sent in by her boss for expressing suicidal thoughts.  Pt endorses passive SI but denies active plans.  ROS otherwise negative.

## 2022-03-24 NOTE — ED BEHAVIORAL HEALTH ASSESSMENT NOTE - VIOLENCE PROTECTIVE FACTORS:
Residential stability/Relationship stability/Employment stability/Engagement in treatment/Insight into violence risk and need for management/treatment/Good treatment response/compliance

## 2022-03-24 NOTE — ED BEHAVIORAL HEALTH ASSESSMENT NOTE - SAFETY PLAN ADDT'L DETAILS
Safety plan discussed with.../Education provided regarding environmental safety / lethal means restriction/Provision of National Suicide Prevention Lifeline 0-235-916-EOQF (6737)

## 2022-03-24 NOTE — ED BEHAVIORAL HEALTH ASSESSMENT NOTE - CURRENT MEDICATION
None Abilify 30mg QD, Klonopin 0.5mg BID, Lamictal 200mg BID, Trazodone 200mg QHS, Wellbutrin XL 450mg Daily, Zoloft 200mg daily

## 2022-03-24 NOTE — ED PROVIDER NOTE - NSFOLLOWUPINSTRUCTIONS_ED_ALL_ED_FT
Please follow up with your psychiatrist.     If symptoms worsen, please return to the emergency department.

## 2022-03-24 NOTE — ED BEHAVIORAL HEALTH ASSESSMENT NOTE - CASE SUMMARY
Patient is a 26-year-old- female, who is domiciled with family, single, employed, with a psychiatric history of Depression, Anxiety, Borderline personality disorder and Anorexia- in ; with no previous psychiatric inpatient hospitalizations, no history of suicide attempts, denies history of violence, denies legal history or arrests; prior history of cannabis use/LSD use, habitual alcohol use 3x weekly. No known history of detox/rehab/withdrawal symptoms. No PMH- reports 1 prior episode of seizure for unknown cause; Allergies to Lexapro (vomit) and seasonal allergies. BIBA referred by work colleagues for suicidal ideation.    Patient endorses persistent and chronic mood symptoms.  Patient denies acute symptoms of depression, shannan, anxiety, psychosis, suicidal/homicidal ideations, intent or plans, denies auditory/visual hallucinations.  Patient does not represent an imminent threat of danger to self or others at this time.  Patient refused voluntary admission.  Patient does not meet criteria for inpatient involuntary hospitalization.  Patient will be discharged home and agrees to discharge disposition.  No acute safety concerns.

## 2022-03-24 NOTE — ED ADULT NURSE NOTE - OBJECTIVE STATEMENT
Pt brought in by EMS from work after telling her boss she was having " dark thoughts". Pt states she has chronic passive suicidal thoughts. Pt calm and cooperative in triage.  Patient to behavior health area. Pt evaluated by medicine and is being evaluated by psychiatry. Waiting for further orders and disposition.   EMMA Elkins

## 2022-03-24 NOTE — ED BEHAVIORAL HEALTH NOTE - BEHAVIORAL HEALTH NOTE
Spoke with Trina Maxwell- Patient is enrolled in individual therapy and group therapy. Patient has chronic passive suicidal ideation but is able to keep self safe and safety plan. She has no imminent safety concerns. Patient is embarrassed regarding a situation that happened with friends and is having trouble facing the situation. She struggles with passive suicidal ideation on a daily basis- it is chronic.

## 2022-03-24 NOTE — ED BEHAVIORAL HEALTH ASSESSMENT NOTE - SUMMARY
22 y/o  female, with prior history of Depression, Anxiety, Anorexia nervosa, with no past psychiatric admission, prior history of seziure with possible cause of lack of sleep per mother, seasonal allergies and Lexapro allergies, ni history of SIB, SI/HI/I/P, substance use of alcohol, cannabis and LSD with no known complication, who presents from home BIB by mother to be evaluated for depressive symptoms.     Patient presents with ongoing depression, anxiety and chronic problems with sleep secondary to noncompliance with treatment. Patient has an outpatient provider in which she stopped attending and she had a relapse of her depressive mood and anxiety since she self-discontinued her medications as of May 2018. At this time, voluntary admission was considered by patient refused and patient does not meet criteria for involutionary admission. Since patient is not at harm to self or others, symptoms can be best addressed with a crisis clinic referral and outpatient care. Alcohol us is concerning as it appears that patient is self-medicating with alcohol and should also be addressed and including in treatment as alcohol use can worse depression. Patient is a 26-year-old-Caucassian female, who is domiciled with family, single, employed, with a psychiatric history of Depression, Anxiety, Borderline personality disorder and Anorexia- in ; with no previous psychiatric inpatient hospitalizations, no history of suicide attempts. denies history of violence, denies legal history or arrests; prior history of cannabis use/LSD use, habitual alcohol use 3x weekly. No known history of detox/rehab/withdrawal symptoms. No PMH- reports 1 prior episode of seizure for unknown cause; Allergies to Lexapro (vomit) and seasonal allergies. BIBA referred by work colleagues for suicidal ideation.     Patient presents to the ED in the context of suicidal ideation. Patient has chronic passive suicidal ideation secondary to BPD. She endorses recent relationship issue with friends leading to impaired distress tolerance related to BPD. Patient endorses chronic depression, anxiety & passive suicidal ideation. Patient is in outpatient tx. She is caring for self, working and sleeping/eating. She is not acutely psychotic, manic or hypomanic. She is future oriented and able to safety plan. She was offered and refused inpatient admission. She does not meet criteria for involuntary inpatient admission. Recommend discharge and Follow up with outpatient admission.     Extensive safety planning performed with patient and family. In addition to extensive discussion of safe places patient can go to, distraction techniques, coping skills, motivational interviewing and who patient can call in the event of crisis including various hotlines. Patient and family agreeing verbally to return patient to ER or call 911 if symptoms worsen or patient has urges to harm self or others. Patient is a 26-year-old- female, who is domiciled with family, single, employed, with a psychiatric history of Depression, Anxiety, Borderline personality disorder and Anorexia- in [facility]; with no previous psychiatric inpatient hospitalizations, no history of suicide attempts. denies history of violence, denies legal history or arrests; prior history of cannabis use/LSD use, habitual alcohol use 3x weekly. No known history of detox/rehab/withdrawal symptoms. No PMH- reports 1 prior episode of seizure for unknown cause; Allergies to Lexapro (vomit) and seasonal allergies. BIBA referred by work colleagues for suicidal ideation.     Patient presents to the ED in the context of suicidal ideation. Patient has chronic passive suicidal ideation secondary to BPD. She endorses recent relationship issue with friends leading to impaired distress tolerance related to BPD. Patient endorses chronic depression, anxiety & passive suicidal ideation. Patient is in outpatient tx. She is caring for self, working and sleeping/eating. She is not acutely psychotic, manic or hypomanic. She is future oriented and able to safety plan. She was offered and refused inpatient admission. She does not meet criteria for involuntary inpatient admission. Recommend discharge and Follow up with outpatient admission.     Extensive safety planning performed with patient and family. In addition to extensive discussion of safe places patient can go to, distraction techniques, coping skills, motivational interviewing and who patient can call in the event of crisis including various hotlines. Patient and family agreeing verbally to return patient to ER or call 911 if symptoms worsen or patient has urges to harm self or others.

## 2022-03-24 NOTE — ED BEHAVIORAL HEALTH ASSESSMENT NOTE - RISK ASSESSMENT
Risk factors include psychiatric diagnoses, chronic hopelessness, unemployed, lack of social support, Impulsiveness, young adult    Protective factors include no suicide attempts, no violence history, supportive housing, no access to guns, supportive family, willingness to seek help, no active suicidal ideation, no homicidal ideation, reality testing, hopefulness for future. Risk factors include psychiatric diagnoses, BPD, substance abuse    Protective factors include no suicide attempts, no violence history, supportive housing, no access to guns, supportive family, willingness to seek help, no inpatient admissions, no active suicidal ideation, no homicidal ideation, reality testing, hopefulness for future. Low Acute Suicide Risk

## 2022-03-24 NOTE — ED BEHAVIORAL HEALTH ASSESSMENT NOTE - DESCRIPTION
Born in [place], lives with family, father  at age 14, attended college but had some financial difficulties and has not been in college in 1.5 years Calm, smiling and in no apparent distress    Vital Signs Last 24 Hrs  T(C): 37 (30 Sep 2018 13:32), Max: 37 (30 Sep 2018 13:32)  T(F): 98.6 (30 Sep 2018 13:32), Max: 98.6 (30 Sep 2018 13:32)  HR: 101 (30 Sep 2018 13:32) (101 - 101)  BP: 145/93 (30 Sep 2018 13:32) (145/93 - 145/93)  BP(mean): --  RR: 16 (30 Sep 2018 13:32) (16 - 16)  SpO2: 100% (30 Sep 2018 13:32) (100% - 100%) Denies During course of ED visit patient was calm and cooperative. Patient was not aggressive or violent and did not require PRN medications.    ICU Vital Signs Last 24 Hrs  T(C): 37 (24 Mar 2022 11:34), Max: 37 (24 Mar 2022 11:34)  T(F): 98.6 (24 Mar 2022 11:34), Max: 98.6 (24 Mar 2022 11:34)  HR: 110 (24 Mar 2022 11:34) (110 - 110)  BP: 127/90 (24 Mar 2022 11:34) (127/90 - 127/90)  BP(mean): --  ABP: --  ABP(mean): --  RR: 18 (24 Mar 2022 11:34) (18 - 18)  SpO2: 99% (24 Mar 2022 11:34) (99% - 99%)

## 2022-03-24 NOTE — ED BEHAVIORAL HEALTH ASSESSMENT NOTE - DIFFERENTIAL
MDD, recurrent with anxious distress vs Persistent depressive disorder with anxious distress, Alcohol use disorder, EMMA, Cluster B personality disorder (history of fear of abandonment) MDD, recurrent with anxious distress vs Persistent depressive disorder with anxious distress, Alcohol use disorder

## 2022-03-24 NOTE — ED BEHAVIORAL HEALTH NOTE - BEHAVIORAL HEALTH NOTE
Worker called patient’s mother Rashmi Mathis ([phone]) for collateral information. All information is as per mother:    Patient is a 26 year old female, domiciled with mother, with a history of depression, employed at [employer],  bibems activated by her workplace for dark thoughts. Mother reports that the patient is presenting with depression and sadness since Monday. Mother is unsure if the patient is suicidal. She states that the patient has been in therapy for years and she has no certain trigger. Patient’s father passed away when she was 14 years old. Mother states that the patient is affiliated with [facility] and attends the pace program Monday and Wednesday with Liz. She states that the patient also sees a therapist, Trina. Mother states that prior to that the patient was at a partial program [facility] from December to January. Mother states that maybe the patient’s medication is not working. Patient has been compliant with medications. Mother states that maybe the virtual treatment is not working either. She states that the patient is prescribed   aripiprazole, Duloxetine, Trazadone, lamotrigine, and bupropion. She states that the patient has no past SA. She states that the patient has a history of cutting behaviors and would cut herself on her upper arm but has not done this in a year. Patient was at [facility] for a day because she kept throwing up. She states that the patient has issues with eating. She states that she is a retired  and has 3 locked guns that is in a locked safe. Patient has no access to this. She states that the patient is not violent or aggressive. She states that the patient drinks alcohol excessively and vapes tobacco and marijuana. Patient last drank Monday. Mother states that the patient showers twice a week at baseline and is not a big eater. Mother thinks that patient needs a change in medication or needs in person treatment. Worker called patient’s mother Rashmi Mathis ([phone]) for collateral information. All information is as per mother:    Patient is a 26 year old female, domiciled with mother, with a history of depression, employed at [employer],  bibems activated by her workplace for dark thoughts. Mother reports that the patient is presenting with depression and sadness since Monday. Mother is unsure if the patient is suicidal. She states that the patient has been in therapy for years and she has no certain trigger. Patient’s father passed away when she was 14 years old. Mother states that the patient is affiliated with [facility] and attends the pace program Monday and Wednesday with Liz. She states that the patient also sees a therapist, Trina. Mother states that prior to that the patient was at a partial program [facility] from December to January. Mother states that maybe the patient’s medication is not working. Patient has been compliant with medications. Mother states that maybe the virtual treatment is not working either. She states that the patient is prescribed   aripiprazole, Duloxetine, Trazadone, lamotrigine, and bupropion. She states that the patient has no past SA. She states that the patient has a history of cutting behaviors and would cut herself on her upper arm but has not done this in a year. Patient was at [facility] for a day because she kept throwing up. She states that the patient has issues with eating. She states that she is a retired  and has 3 locked guns that is in a locked safe. Patient has no access to this. She states that the patient is not violent or aggressive. She states that the patient drinks alcohol excessively and vapes tobacco and marijuana. Patient last drank Monday. Mother states that the patient showers twice a week at baseline and is not a big eater. Mother thinks that patient needs a change in medication or needs in person treatment.    Patient is cleared psychiatrically. Worker called patient's mother and informed of patient discharge. Worker encouraged patient's mother that she lock away any sharps or pills for patient access. Mother confirmed that her guns is locked away in a safe and patient has no access to this. Mother is picking up patient at 2:30pm.
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
## 2022-03-26 NOTE — ED BEHAVIORAL HEALTH NOTE - BEHAVIORAL HEALTH NOTE
High Risk Log f/u tel#: [phone]     SW spoke w/ Pt re: high risk log f/u. Pt states that she has BPD and works on DBT skills w/ her therapist Trina noted in chart. She states that her psychiatrist is difficult to engage with for her and she would like someone new and spoke w/ staff here on Friday to discuss the same. Pt states she is unsure she got everything she needed at ED because she doesn't believe she needs inpt but also feels outpt hasn't been working as well. She has engaged w/ partial programs in the past, but looks to seek further psychiatric f/u at this time. SW to advise previous SW staff who worked w/ pt to determine what the f/u with her was discussed, and will seek to update pt w/ community resources.    She states she practiced self-care yesterday, taking off from work and maintaining spirituality. SW expressed that there was literature that could also support this, and provided some information on the same. Pt was extremely receptive to reading up on spirituality & healing w/ her DBT treatment. She was pleasant in conversation and receptive to SW guidance and brief intervention. No si/hi in evidence or expressed verbally. Pt said "I'm safe, I'm fine." Spoke with pt; states Monday small amounts of urine, now normal amount and voiding every 30 mins.   Denies drinking lots of water.   Little bit of pelvic pressure.  She notes having symptoms before and states Dr. He informed cultures were negative.  She states she is open to abx, BUT does not really want to and does not want to \" deal with this (symptoms) over the weekend.\"    No blood, discharge, itchy, pain of any sort, or cloudy urine.    Routing to PCP, pls advise.   High Risk Log f/u tel#: [phone] ***further F/u needed****    SW spoke w/ Pt re: high risk log f/u. Pt states that she has BPD and works on DBT skills w/ her therapist Trina noted in chart. She states that her psychiatrist is difficult to engage with for her and she would like someone new and spoke w/ staff here on Friday to discuss the same. Pt states she is unsure she got everything she needed at ED because she doesn't believe she needs inpt but also feels outpt hasn't been working as well. She has engaged w/ partial programs in the past, but looks to seek further psychiatric f/u at this time. SW to advise previous SW staff who worked w/ pt to determine what the f/u with her was discussed, and will seek to update pt w/ community resources.    She states she practiced self-care yesterday, taking off from work and maintaining spirituality. SW expressed that there was literature that could also support this, and provided some information on the same. Pt was extremely receptive to reading up on spirituality & healing w/ her DBT treatment. She was pleasant in conversation and receptive to SW guidance and brief intervention. No si/hi in evidence or expressed verbally. Pt said "I'm safe, I'm fine."

## 2022-05-06 PROCEDURE — 90834 PSYTX W PT 45 MINUTES: CPT | Mod: 95

## 2022-05-10 PROCEDURE — 90834 PSYTX W PT 45 MINUTES: CPT | Mod: 95

## 2022-06-07 PROCEDURE — 90832 PSYTX W PT 30 MINUTES: CPT | Mod: 95

## 2023-08-05 ENCOUNTER — NON-APPOINTMENT (OUTPATIENT)
Age: 28
End: 2023-08-05

## 2023-08-09 ENCOUNTER — NON-APPOINTMENT (OUTPATIENT)
Age: 28
End: 2023-08-09

## 2023-08-12 ENCOUNTER — INPATIENT (INPATIENT)
Facility: HOSPITAL | Age: 28
LOS: 5 days | Discharge: HOME | End: 2023-08-18
Attending: PSYCHIATRY & NEUROLOGY | Admitting: PSYCHIATRY & NEUROLOGY
Payer: MEDICAID

## 2023-08-12 VITALS
OXYGEN SATURATION: 100 % | DIASTOLIC BLOOD PRESSURE: 77 MMHG | RESPIRATION RATE: 16 BRPM | SYSTOLIC BLOOD PRESSURE: 114 MMHG | HEART RATE: 66 BPM | TEMPERATURE: 97 F

## 2023-08-12 DIAGNOSIS — F22 DELUSIONAL DISORDERS: ICD-10-CM

## 2023-08-12 LAB
ALBUMIN SERPL ELPH-MCNC: 4.7 G/DL — SIGNIFICANT CHANGE UP (ref 3.3–5)
ALP SERPL-CCNC: 83 U/L — SIGNIFICANT CHANGE UP (ref 40–120)
ALT FLD-CCNC: 21 U/L — SIGNIFICANT CHANGE UP (ref 4–33)
AMPHET UR-MCNC: NEGATIVE — SIGNIFICANT CHANGE UP
ANION GAP SERPL CALC-SCNC: 15 MMOL/L — HIGH (ref 7–14)
APAP SERPL-MCNC: <10 UG/ML — LOW (ref 15–25)
APPEARANCE UR: ABNORMAL
AST SERPL-CCNC: 29 U/L — SIGNIFICANT CHANGE UP (ref 4–32)
B PERT DNA SPEC QL NAA+PROBE: SIGNIFICANT CHANGE UP
B PERT+PARAPERT DNA PNL SPEC NAA+PROBE: SIGNIFICANT CHANGE UP
BACTERIA # UR AUTO: ABNORMAL /HPF
BARBITURATES UR SCN-MCNC: NEGATIVE — SIGNIFICANT CHANGE UP
BASOPHILS # BLD AUTO: 0.08 K/UL — SIGNIFICANT CHANGE UP (ref 0–0.2)
BASOPHILS NFR BLD AUTO: 1 % — SIGNIFICANT CHANGE UP (ref 0–2)
BENZODIAZ UR-MCNC: NEGATIVE — SIGNIFICANT CHANGE UP
BILIRUB SERPL-MCNC: 0.8 MG/DL — SIGNIFICANT CHANGE UP (ref 0.2–1.2)
BILIRUB UR-MCNC: NEGATIVE — SIGNIFICANT CHANGE UP
BORDETELLA PARAPERTUSSIS (RAPRVP): SIGNIFICANT CHANGE UP
BUN SERPL-MCNC: 6 MG/DL — LOW (ref 7–23)
C PNEUM DNA SPEC QL NAA+PROBE: SIGNIFICANT CHANGE UP
CALCIUM SERPL-MCNC: 9.8 MG/DL — SIGNIFICANT CHANGE UP (ref 8.4–10.5)
CAST: 4 /LPF — SIGNIFICANT CHANGE UP (ref 0–4)
CHLORIDE SERPL-SCNC: 99 MMOL/L — SIGNIFICANT CHANGE UP (ref 98–107)
CO2 SERPL-SCNC: 25 MMOL/L — SIGNIFICANT CHANGE UP (ref 22–31)
COCAINE METAB.OTHER UR-MCNC: NEGATIVE — SIGNIFICANT CHANGE UP
COLOR SPEC: SIGNIFICANT CHANGE UP
CREAT SERPL-MCNC: 0.59 MG/DL — SIGNIFICANT CHANGE UP (ref 0.5–1.3)
CREATININE URINE RESULT, DAU: 343 MG/DL — SIGNIFICANT CHANGE UP
DIFF PNL FLD: NEGATIVE — SIGNIFICANT CHANGE UP
EGFR: 127 ML/MIN/1.73M2 — SIGNIFICANT CHANGE UP
EOSINOPHIL # BLD AUTO: 0.01 K/UL — SIGNIFICANT CHANGE UP (ref 0–0.5)
EOSINOPHIL NFR BLD AUTO: 0.1 % — SIGNIFICANT CHANGE UP (ref 0–6)
ETHANOL SERPL-MCNC: <10 MG/DL — SIGNIFICANT CHANGE UP
FLUAV SUBTYP SPEC NAA+PROBE: SIGNIFICANT CHANGE UP
FLUBV RNA SPEC QL NAA+PROBE: SIGNIFICANT CHANGE UP
GLUCOSE SERPL-MCNC: 107 MG/DL — HIGH (ref 70–99)
GLUCOSE UR QL: NEGATIVE MG/DL — SIGNIFICANT CHANGE UP
HADV DNA SPEC QL NAA+PROBE: SIGNIFICANT CHANGE UP
HCG SERPL-ACNC: <1 MIU/ML — SIGNIFICANT CHANGE UP
HCOV 229E RNA SPEC QL NAA+PROBE: SIGNIFICANT CHANGE UP
HCOV HKU1 RNA SPEC QL NAA+PROBE: SIGNIFICANT CHANGE UP
HCOV NL63 RNA SPEC QL NAA+PROBE: SIGNIFICANT CHANGE UP
HCOV OC43 RNA SPEC QL NAA+PROBE: SIGNIFICANT CHANGE UP
HCT VFR BLD CALC: 44.3 % — SIGNIFICANT CHANGE UP (ref 34.5–45)
HGB BLD-MCNC: 15.6 G/DL — HIGH (ref 11.5–15.5)
HMPV RNA SPEC QL NAA+PROBE: SIGNIFICANT CHANGE UP
HPIV1 RNA SPEC QL NAA+PROBE: SIGNIFICANT CHANGE UP
HPIV2 RNA SPEC QL NAA+PROBE: SIGNIFICANT CHANGE UP
HPIV3 RNA SPEC QL NAA+PROBE: SIGNIFICANT CHANGE UP
HPIV4 RNA SPEC QL NAA+PROBE: SIGNIFICANT CHANGE UP
IANC: 5.62 K/UL — SIGNIFICANT CHANGE UP (ref 1.8–7.4)
IMM GRANULOCYTES NFR BLD AUTO: 0.4 % — SIGNIFICANT CHANGE UP (ref 0–0.9)
KETONES UR-MCNC: 40 MG/DL
LEUKOCYTE ESTERASE UR-ACNC: ABNORMAL
LYMPHOCYTES # BLD AUTO: 1.85 K/UL — SIGNIFICANT CHANGE UP (ref 1–3.3)
LYMPHOCYTES # BLD AUTO: 22.8 % — SIGNIFICANT CHANGE UP (ref 13–44)
M PNEUMO DNA SPEC QL NAA+PROBE: SIGNIFICANT CHANGE UP
MCHC RBC-ENTMCNC: 32 PG — SIGNIFICANT CHANGE UP (ref 27–34)
MCHC RBC-ENTMCNC: 35.2 GM/DL — SIGNIFICANT CHANGE UP (ref 32–36)
MCV RBC AUTO: 90.8 FL — SIGNIFICANT CHANGE UP (ref 80–100)
METHADONE UR-MCNC: NEGATIVE — SIGNIFICANT CHANGE UP
MONOCYTES # BLD AUTO: 0.52 K/UL — SIGNIFICANT CHANGE UP (ref 0–0.9)
MONOCYTES NFR BLD AUTO: 6.4 % — SIGNIFICANT CHANGE UP (ref 2–14)
NEUTROPHILS # BLD AUTO: 5.62 K/UL — SIGNIFICANT CHANGE UP (ref 1.8–7.4)
NEUTROPHILS NFR BLD AUTO: 69.3 % — SIGNIFICANT CHANGE UP (ref 43–77)
NITRITE UR-MCNC: NEGATIVE — SIGNIFICANT CHANGE UP
NRBC # BLD: 0 /100 WBCS — SIGNIFICANT CHANGE UP (ref 0–0)
NRBC # FLD: 0 K/UL — SIGNIFICANT CHANGE UP (ref 0–0)
OPIATES UR-MCNC: NEGATIVE — SIGNIFICANT CHANGE UP
OXYCODONE UR-MCNC: NEGATIVE — SIGNIFICANT CHANGE UP
PCP SPEC-MCNC: SIGNIFICANT CHANGE UP
PCP UR-MCNC: NEGATIVE — SIGNIFICANT CHANGE UP
PH UR: 6 — SIGNIFICANT CHANGE UP (ref 5–8)
PLATELET # BLD AUTO: 374 K/UL — SIGNIFICANT CHANGE UP (ref 150–400)
POTASSIUM SERPL-MCNC: 3.1 MMOL/L — LOW (ref 3.5–5.3)
POTASSIUM SERPL-SCNC: 3.1 MMOL/L — LOW (ref 3.5–5.3)
PROT SERPL-MCNC: 8.6 G/DL — HIGH (ref 6–8.3)
PROT UR-MCNC: 30 MG/DL
RAPID RVP RESULT: SIGNIFICANT CHANGE UP
RBC # BLD: 4.88 M/UL — SIGNIFICANT CHANGE UP (ref 3.8–5.2)
RBC # FLD: 12 % — SIGNIFICANT CHANGE UP (ref 10.3–14.5)
RBC CASTS # UR COMP ASSIST: 4 /HPF — SIGNIFICANT CHANGE UP (ref 0–4)
REVIEW: SIGNIFICANT CHANGE UP
RSV RNA SPEC QL NAA+PROBE: SIGNIFICANT CHANGE UP
RV+EV RNA SPEC QL NAA+PROBE: SIGNIFICANT CHANGE UP
SALICYLATES SERPL-MCNC: <0.3 MG/DL — LOW (ref 15–30)
SARS-COV-2 RNA SPEC QL NAA+PROBE: SIGNIFICANT CHANGE UP
SODIUM SERPL-SCNC: 139 MMOL/L — SIGNIFICANT CHANGE UP (ref 135–145)
SP GR SPEC: 1.02 — SIGNIFICANT CHANGE UP (ref 1–1.03)
SQUAMOUS # UR AUTO: 113 /HPF — HIGH (ref 0–5)
THC UR QL: POSITIVE
TOXICOLOGY SCREEN, DRUGS OF ABUSE, SERUM RESULT: SIGNIFICANT CHANGE UP
TSH SERPL-MCNC: 0.6 UIU/ML — SIGNIFICANT CHANGE UP (ref 0.27–4.2)
UROBILINOGEN FLD QL: 1 MG/DL — SIGNIFICANT CHANGE UP (ref 0.2–1)
WBC # BLD: 8.11 K/UL — SIGNIFICANT CHANGE UP (ref 3.8–10.5)
WBC # FLD AUTO: 8.11 K/UL — SIGNIFICANT CHANGE UP (ref 3.8–10.5)
WBC UR QL: 32 /HPF — HIGH (ref 0–5)

## 2023-08-12 PROCEDURE — 99285 EMERGENCY DEPT VISIT HI MDM: CPT | Mod: GC

## 2023-08-12 PROCEDURE — 99285 EMERGENCY DEPT VISIT HI MDM: CPT

## 2023-08-12 RX ORDER — DIPHENHYDRAMINE HCL 50 MG
50 CAPSULE ORAL ONCE
Refills: 0 | Status: DISCONTINUED | OUTPATIENT
Start: 2023-08-12 | End: 2023-08-18

## 2023-08-12 RX ORDER — HALOPERIDOL DECANOATE 100 MG/ML
5 INJECTION INTRAMUSCULAR ONCE
Refills: 0 | Status: DISCONTINUED | OUTPATIENT
Start: 2023-08-12 | End: 2023-08-18

## 2023-08-12 RX ORDER — DIPHENHYDRAMINE HCL 50 MG
50 CAPSULE ORAL EVERY 6 HOURS
Refills: 0 | Status: DISCONTINUED | OUTPATIENT
Start: 2023-08-12 | End: 2023-08-18

## 2023-08-12 RX ORDER — POTASSIUM CHLORIDE 20 MEQ
40 PACKET (EA) ORAL DAILY
Refills: 0 | Status: DISCONTINUED | OUTPATIENT
Start: 2023-08-12 | End: 2023-08-12

## 2023-08-12 RX ORDER — POTASSIUM CHLORIDE 20 MEQ
40 PACKET (EA) ORAL ONCE
Refills: 0 | Status: DISCONTINUED | OUTPATIENT
Start: 2023-08-12 | End: 2023-08-16

## 2023-08-12 RX ORDER — HALOPERIDOL DECANOATE 100 MG/ML
5 INJECTION INTRAMUSCULAR EVERY 6 HOURS
Refills: 0 | Status: DISCONTINUED | OUTPATIENT
Start: 2023-08-12 | End: 2023-08-18

## 2023-08-12 RX ORDER — POTASSIUM CHLORIDE 20 MEQ
40 PACKET (EA) ORAL ONCE
Refills: 0 | Status: COMPLETED | OUTPATIENT
Start: 2023-08-12 | End: 2023-08-12

## 2023-08-12 RX ADMIN — Medication 300 MILLIGRAM(S): at 17:43

## 2023-08-12 RX ADMIN — Medication 40 MILLIEQUIVALENT(S): at 17:44
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
## 2023-08-12 NOTE — ED ADULT NURSE NOTE - NSFALLUNIVINTERV_ED_ALL_ED
Bed/Stretcher in lowest position, wheels locked, appropriate side rails in place/Call bell, personal items and telephone in reach/Instruct patient to call for assistance before getting out of bed/chair/stretcher/Non-slip footwear applied when patient is off stretcher/[place] to call system/Physically safe environment - no spills, clutter or unnecessary equipment/Purposeful proactive rounding/Room/bathroom lighting operational, light cord in reach
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
## 2023-08-12 NOTE — ED BEHAVIORAL HEALTH NOTE - BEHAVIORAL HEALTH NOTE
COVID Exposure Screen- Patient    1.        *Have you had a COVID-19 test in the last 90 days?  ( x ) Yes   (  ) No   (  ) Unknown- Reason: _____    IF YES PROCEED TO QUESTION #2. IF NO OR UNKNOWN, PLEASE SKIP TO QUESTION #3.    2.          Date of test(s) and result(s): ___negative_____    3.        *Have you tested positive for COVID-19 antibodies? (  ) Yes   (  ) No   ( x ) Unknown- Reason: _____    IF YES PROCEED TO QUESTION #4. IF NO or UNKNOWN, PLEASE SKIP TO QUESTION #5.    4.        Date of positive antibody test: ________    5.        *Have you received 2 doses of the COVID-19 vaccine? ( x ) Yes   (  ) No   (  ) Unknown- Reason: _____    IF YES PROCEED TO QUESTION #6. IF NO or UNKNOWN, PLEASE SKIP TO QUESTION #7.    6.        Date of second dose: ________    7.        *In the past 10 days, have you been around anyone with a positive COVID-19 test?* (  ) Yes   (  ) No   (  ) Unknown- Reason: ____    IF YES PROCEED TO QUESTION #8. IF NO or UNKNOWN, PLEASE SKIP TO QUESTION #13.    8.        Were you within 6 feet of them for at least 15 minutes? (  ) Yes   (  ) No   (  ) Unknown- Reason: _____    9.        Have you provided care for them? (  ) Yes   (  ) No   (  ) Unknown- Reason: ______    10.     Have you had direct physical contact with them (touched, hugged, or kissed them)? (  ) Yes   (  ) No    (  ) Unknown- Reason: _____    11.     Have you shared eating or drinking utensils with them? (  ) Yes   (  ) No    (  ) Unknown- Reason: ____    12.     Have they sneezed, coughed, or somehow gotten respiratory droplets on you? (  ) Yes   (  ) No    (  ) Unknown- Reason: ______    13.     *Have you been out of [place] within the past 10 days?* (  ) Yes   ( x ) No   (  ) Unknown- Reason: _____    IF YES PLEASE ANSWER THE FOLLOWING QUESTIONS:    14.     Which state/country have you been to? ______    15.     Were you there over 24 hours? (  ) Yes   (  ) No    (  ) Unknown- Reason: ______    16.     Date of return to [facility]: ______ COVID Exposure Screen- Patient    1.        *Have you had a COVID-19 test in the last 90 days?  ( x ) Yes   (  ) No   (  ) Unknown- Reason: _____    IF YES PROCEED TO QUESTION #2. IF NO OR UNKNOWN, PLEASE SKIP TO QUESTION #3.    2.          Date of test(s) and result(s): ___negative_____    3.        *Have you tested positive for COVID-19 antibodies? (  ) Yes   (  ) No   ( x ) Unknown- Reason: _____    IF YES PROCEED TO QUESTION #4. IF NO or UNKNOWN, PLEASE SKIP TO QUESTION #5.    4.        Date of positive antibody test: ________    5.        *Have you received 2 doses of the COVID-19 vaccine? ( x ) Yes   (  ) No   (  ) Unknown- Reason: _____    IF YES PROCEED TO QUESTION #6. IF NO or UNKNOWN, PLEASE SKIP TO QUESTION #7.    6.        Date of second dose: ________    7.        *In the past 10 days, have you been around anyone with a positive COVID-19 test?* (  ) Yes   (  ) No   (  ) Unknown- Reason: ____    IF YES PROCEED TO QUESTION #8. IF NO or UNKNOWN, PLEASE SKIP TO QUESTION #13.    8.        Were you within 6 feet of them for at least 15 minutes? (  ) Yes   (  ) No   (  ) Unknown- Reason: _____    9.        Have you provided care for them? (  ) Yes   (  ) No   (  ) Unknown- Reason: ______    10.     Have you had direct physical contact with them (touched, hugged, or kissed them)? (  ) Yes   (  ) No    (  ) Unknown- Reason: ____    11.     Have you shared eating or drinking utensils with them? (  ) Yes   (  ) No    (  ) Unknown- Reason: ____    12.     Have they sneezed, coughed, or somehow gotten respiratory droplets on you? (  ) Yes   (  ) No    (  ) Unknown- Reason: ______    13.     *Have you been out of [place] within the past 10 days?* (  ) Yes   ( x ) No   (  ) Unknown- Reason: _____    IF YES PLEASE ANSWER THE FOLLOWING QUESTIONS:    14.     Which state/country have you been to? ______    15.     Were you there over 24 hours? (  ) Yes   (  ) No    (  ) Unknown- Reason: ______    16.     Date of return to [facility]: ______

## 2023-08-12 NOTE — ED PROVIDER NOTE - CARE PLAN
Principal Discharge DX:	Renée   1 Principal Discharge DX:	Paranoia  Secondary Diagnosis:	Cellulitis

## 2023-08-12 NOTE — ED BEHAVIORAL HEALTH ASSESSMENT NOTE - RISK ASSESSMENT
Acute risk factors include psychosis, severe anxiety, mood disturbance, tenuous housing. Chronic risk factors include BPD, depression, anxiety, history of NSSIB, history of SA, history of medication non-adherence. Protective factors include motivation to get better, motivation to engage in treatment, supportive network, good health. Overall, patient poses a risk of danger to herself and requires inpatient hospitalization for safety, stabilization, and medication optimization.

## 2023-08-12 NOTE — ED BEHAVIORAL HEALTH ASSESSMENT NOTE - PAST PSYCHOTROPIC MEDICATION
Abilify 30mg QD, Klonopin 0.5mg BID, Lamictal 200mg BID, Trazodone 200mg QHS, Wellbutrin XL 450mg Daily, Cymbalta 60mg, Lexapro 5mg, Trazodone 100mg, Remeron 15mg, Neurontin 300 TID

## 2023-08-12 NOTE — ED BEHAVIORAL HEALTH ASSESSMENT NOTE - SUMMARY
28F, living in private residence w/ family, single, no dependents, unemployed, past psychiatric history of borderline personality disorder, depression, and anxiety, no past psychiatric hospitalizations, history of multiple SAs (last a year and a half ago by unknown means), remote history of NSSIB by cutting, near-daily cannabis use, no other known substance, no history of legal issues/aggression/violence, PMH of anorexia nervosa, who was BIB for paranoia, anxiety, and depression.    Patient presents as vague, tangential, and thought blocked at times. Patient w/ a variety of depressive, anxious, and psychotic symptoms in the setting of psychosocial stressors. Patient w/ excessive anxiety, affective dysregulation, and paranoia, impacting ability to care for self. Patient recently had been living out of car due to paranoia. Per collateral provider, patient making irrational/unsafe decisions due to paranoia (such as living out of car), and has been poorly tending to ADLs. Patient w/ very limited PO intake, as well as non-adherence w/ antibiotics for cellulitis (as well as non-adherence w/ psychiatric medication). Although patient is w/o active S/I/I/P, patient's severe symptoms and change in behavior suggest she lacks the ability to adequately care for herself and warrants involuntary hospitalization for safety, stabilization, and medication optimization. Patient will be admitted to [facility].    Plan:  1. Admit to [facility]  2. Routine observation appropriate  3. Defer standing psychiatric medications to primary team   4. Psychiatric PRNs: Haldol 5mg/Ativan 2mg/Benadryl 50mg PO/IM q4hrs  5. No acute medical needs

## 2023-08-12 NOTE — ED BEHAVIORAL HEALTH ASSESSMENT NOTE - OTHER PAST PSYCHIATRIC HISTORY (INCLUDE DETAILS REGARDING ONSET, COURSE OF ILLNESS, INPATIENT/OUTPATIENT TREATMENT)
History of BPD, depression, anxiety  History of SA  History of NSSIB  No history of psychiatric hospitalization  History of many past medication trials

## 2023-08-12 NOTE — ED PROVIDER NOTE - OBJECTIVE STATEMENT
28-year-old female brought in by her mother  with concerns about paranoid ideation, severe depression, noncompliance with medications.  patient is decreased p.o. intake but claims to be drinking fluids well.  Mother says she thinks there are cameras in the house watching her every move.  Patient has a history of anorexia and what had been hospitalized as a adolescent.  Patient was admitted to [place] about a month ago.  Mother states patient has been living in a car and just came back recently to her house and is concerned about her state of mind.  Patient also has some small infections that she denies are self-inflicted and she has been taking cephalexin for.

## 2023-08-12 NOTE — ED PROVIDER NOTE - PROGRESS NOTE DETAILS
Susie: Labs notable for low K (repleted) and urine THC. UA c/w UTI, but no UTI Sx. No indication for treatment, per IDSA guidelines. https://www.idsociety.org/practice-guideline/asymptomatic-bacteriuria/.

## 2023-08-12 NOTE — ED PROVIDER NOTE - CLINICAL SUMMARY MEDICAL DECISION MAKING FREE TEXT BOX
28-year-old female with history of anxiety and depression and paranoia presenting with all the symptoms, feels like there is a camera watching her every move, poor p.o. intake, not consistent  on taking  her psych meds.  will get labs psych consult placed to put in the psych ER. 28-year-old female with history of anxiety and depression and paranoia presenting with all the symptoms, feels like there is a camera watching her every move, poor p.o. intake, not consistent  on taking  her psych meds.  will get labs psych consult placed to put in the psych ER. patient with skin infections on left inner thigh and right posterior thigh, atypical, will switch to clindamycin. Patient denies self inflicted lesions.

## 2023-08-12 NOTE — ED ADULT NURSE NOTE - OBJECTIVE STATEMENT
Received pt in  pt bought in by EMS for paranoia pt c/o depression & si denies hi/avh presently, emotional support provided eval on going.

## 2023-08-12 NOTE — ED BEHAVIORAL HEALTH ASSESSMENT NOTE - DESCRIPTION
Calm, cooperative in the ED. No PO or IM medications required.     T(C): 36.3 (12 Aug 2023 14:22), Max: 36.3 (12 Aug 2023 14:22)  T(F): 97.4 (12 Aug 2023 14:22), Max: 97.4 (12 Aug 2023 14:22)  HR: 66 (12 Aug 2023 14:22) (66 - 66)  BP: 114/77 (12 Aug 2023 14:22) (114/77 - 114/77)  BP(mean): --  ABP: --  ABP(mean): --  RR: 16 (12 Aug 2023 14:22) (16 - 16)  SpO2: 100% (12 Aug 2023 14:22) (100% - 100%)    O2 Parameters below as of 12 Aug 2023 14:22  Patient On (Oxygen Delivery Method): room air Anorexia Nervosa recently living out of car, now living w/ family, completed associate's degree, not employed, single, no dependents

## 2023-08-12 NOTE — ED BEHAVIORAL HEALTH ASSESSMENT NOTE - NSBHATTESTCOMMENTATTENDFT_PSY_A_CORE
28F, living in private residence w/ family, single, no dependents, unemployed, past psychiatric history of borderline personality disorder, depression, and anxiety, no past psychiatric hospitalizations, history of multiple SAs (last a year and a half ago by unknown means), remote history of NSSIB by cutting, near-daily cannabis use, no other known substance, no history of legal issues/aggression/violence, PMH of anorexia nervosa, who was BIB for paranoia, anxiety, and depression in the context of acute psychosocial stress     In the emergency room, patient presents disorganized tangential at times, mumbling and not making any sense. Told writer that she is here because of crippling anxiety and inability to function. Patient denies any specific trigger. However, she feels that "I cannot loose love of my life". When writer  asks her to elaborate further about it, her comment was " I am not sure" I feel people are after me and I am not sure what they are going to do". she was very tangential about if she's having any interpersonal relationship issues which caused her to be more anxious today.     At this time patient appears highly anxious, disorganized, unable to take care of her self, and as per family has been acting impulsively and putting our life at risk. At this time patient needs psychiatric inpatient hospitalization for safety, and stablization

## 2023-08-12 NOTE — ED PROVIDER NOTE - PHYSICAL EXAMINATION
Anxious slightly paranoid acting female in no acute distress   pupils equal round and reactive to light   cranial nerves intact   lungs clear   heart sounds normal   abdomen soft nontender   extremities no edema   neuro nonfocal. Anxious slightly paranoid acting female in no acute distress   pupils equal round and reactive to light   cranial nerves intact   lungs clear   heart sounds normal   abdomen soft nontender left thigh with  2cm redness and mild swelling, no fluctuance.  right posterior thigh with erythematous area no induration 5x6cm .   extremities no edema   neuro nonfocal.

## 2023-08-12 NOTE — ED ADULT TRIAGE NOTE - CHIEF COMPLAINT QUOTE
brought in by mother for paranoia, appears anxious, pt denies S/I, H/I, hallucinations hx of panic attacks

## 2023-08-13 DIAGNOSIS — F60.3 BORDERLINE PERSONALITY DISORDER: ICD-10-CM

## 2023-08-13 DIAGNOSIS — F41.9 ANXIETY DISORDER, UNSPECIFIED: ICD-10-CM

## 2023-08-13 DIAGNOSIS — Z91.199 PATIENT'S NONCOMPLIANCE WITH OTHER MEDICAL TREATMENT AND REGIMEN DUE TO UNSPECIFIED REASON: ICD-10-CM

## 2023-08-13 DIAGNOSIS — F12.10 CANNABIS ABUSE, UNCOMPLICATED: ICD-10-CM

## 2023-08-13 LAB
A1C WITH ESTIMATED AVERAGE GLUCOSE RESULT: 4.6 % — SIGNIFICANT CHANGE UP (ref 4–5.6)
ALBUMIN SERPL ELPH-MCNC: 4.7 G/DL — SIGNIFICANT CHANGE UP (ref 3.3–5)
ALP SERPL-CCNC: 72 U/L — SIGNIFICANT CHANGE UP (ref 40–120)
ALT FLD-CCNC: 18 U/L — SIGNIFICANT CHANGE UP (ref 4–33)
ANION GAP SERPL CALC-SCNC: 14 MMOL/L — SIGNIFICANT CHANGE UP (ref 7–14)
AST SERPL-CCNC: 23 U/L — SIGNIFICANT CHANGE UP (ref 4–32)
BASOPHILS # BLD AUTO: 0.07 K/UL — SIGNIFICANT CHANGE UP (ref 0–0.2)
BASOPHILS NFR BLD AUTO: 1 % — SIGNIFICANT CHANGE UP (ref 0–2)
BILIRUB SERPL-MCNC: 1.2 MG/DL — SIGNIFICANT CHANGE UP (ref 0.2–1.2)
BUN SERPL-MCNC: 6 MG/DL — LOW (ref 7–23)
CALCIUM SERPL-MCNC: 9.6 MG/DL — SIGNIFICANT CHANGE UP (ref 8.4–10.5)
CHLORIDE SERPL-SCNC: 99 MMOL/L — SIGNIFICANT CHANGE UP (ref 98–107)
CHOLEST SERPL-MCNC: 188 MG/DL — SIGNIFICANT CHANGE UP
CO2 SERPL-SCNC: 26 MMOL/L — SIGNIFICANT CHANGE UP (ref 22–31)
CREAT SERPL-MCNC: 0.63 MG/DL — SIGNIFICANT CHANGE UP (ref 0.5–1.3)
EGFR: 125 ML/MIN/1.73M2 — SIGNIFICANT CHANGE UP
EOSINOPHIL # BLD AUTO: 0.01 K/UL — SIGNIFICANT CHANGE UP (ref 0–0.5)
EOSINOPHIL NFR BLD AUTO: 0.1 % — SIGNIFICANT CHANGE UP (ref 0–6)
ESTIMATED AVERAGE GLUCOSE: 85 — SIGNIFICANT CHANGE UP
GLUCOSE SERPL-MCNC: 84 MG/DL — SIGNIFICANT CHANGE UP (ref 70–99)
HCG SERPL-ACNC: <1 MIU/ML — SIGNIFICANT CHANGE UP
HCT VFR BLD CALC: 45.4 % — HIGH (ref 34.5–45)
HDLC SERPL-MCNC: 60 MG/DL — SIGNIFICANT CHANGE UP
HGB BLD-MCNC: 15.6 G/DL — HIGH (ref 11.5–15.5)
IANC: 4.92 K/UL — SIGNIFICANT CHANGE UP (ref 1.8–7.4)
IMM GRANULOCYTES NFR BLD AUTO: 0.4 % — SIGNIFICANT CHANGE UP (ref 0–0.9)
LIPID PNL WITH DIRECT LDL SERPL: 113 MG/DL — HIGH
LYMPHOCYTES # BLD AUTO: 1.6 K/UL — SIGNIFICANT CHANGE UP (ref 1–3.3)
LYMPHOCYTES # BLD AUTO: 22.6 % — SIGNIFICANT CHANGE UP (ref 13–44)
MCHC RBC-ENTMCNC: 31.8 PG — SIGNIFICANT CHANGE UP (ref 27–34)
MCHC RBC-ENTMCNC: 34.4 GM/DL — SIGNIFICANT CHANGE UP (ref 32–36)
MCV RBC AUTO: 92.7 FL — SIGNIFICANT CHANGE UP (ref 80–100)
MONOCYTES # BLD AUTO: 0.46 K/UL — SIGNIFICANT CHANGE UP (ref 0–0.9)
MONOCYTES NFR BLD AUTO: 6.5 % — SIGNIFICANT CHANGE UP (ref 2–14)
NEUTROPHILS # BLD AUTO: 4.92 K/UL — SIGNIFICANT CHANGE UP (ref 1.8–7.4)
NEUTROPHILS NFR BLD AUTO: 69.4 % — SIGNIFICANT CHANGE UP (ref 43–77)
NON HDL CHOLESTEROL: 128 MG/DL — SIGNIFICANT CHANGE UP
NRBC # BLD: 0 /100 WBCS — SIGNIFICANT CHANGE UP (ref 0–0)
NRBC # FLD: 0 K/UL — SIGNIFICANT CHANGE UP (ref 0–0)
PLATELET # BLD AUTO: 372 K/UL — SIGNIFICANT CHANGE UP (ref 150–400)
POTASSIUM SERPL-MCNC: 3.8 MMOL/L — SIGNIFICANT CHANGE UP (ref 3.5–5.3)
POTASSIUM SERPL-SCNC: 3.8 MMOL/L — SIGNIFICANT CHANGE UP (ref 3.5–5.3)
PROT SERPL-MCNC: 8 G/DL — SIGNIFICANT CHANGE UP (ref 6–8.3)
RBC # BLD: 4.9 M/UL — SIGNIFICANT CHANGE UP (ref 3.8–5.2)
RBC # FLD: 12.1 % — SIGNIFICANT CHANGE UP (ref 10.3–14.5)
SODIUM SERPL-SCNC: 139 MMOL/L — SIGNIFICANT CHANGE UP (ref 135–145)
TRIGL SERPL-MCNC: 74 MG/DL — SIGNIFICANT CHANGE UP
TSH SERPL-MCNC: 0.69 UIU/ML — SIGNIFICANT CHANGE UP (ref 0.27–4.2)
WBC # BLD: 7.09 K/UL — SIGNIFICANT CHANGE UP (ref 3.8–10.5)
WBC # FLD AUTO: 7.09 K/UL — SIGNIFICANT CHANGE UP (ref 3.8–10.5)

## 2023-08-13 PROCEDURE — 93010 ELECTROCARDIOGRAM REPORT: CPT

## 2023-08-13 RX ADMIN — Medication 2 MILLIGRAM(S): at 08:12

## 2023-08-13 NOTE — BH INPATIENT PSYCHIATRY ASSESSMENT NOTE - NSBHMETABOLIC_PSY_ALL_CORE_FT
BMI: BMI (kg/m2): 18.6 (08-12-23 @ 19:05)  HbA1c:   Glucose:   BP: 127/74 (08-12-23 @ 18:12) (114/77 - 127/74)  Lipid Panel:  BMI: BMI (kg/m2): 18.6 (08-12-23 @ 19:05)  HbA1c: A1C with Estimated Average Glucose Result: 4.6 % (08-13-23 @ 09:50)    Glucose:   BP: 127/74 (08-12-23 @ 18:12) (114/77 - 127/74)  Lipid Panel: Date/Time: 08-13-23 @ 09:50  Cholesterol, Serum: 188  Direct LDL: --  HDL Cholesterol, Serum: 60  Total Cholesterol/HDL Ration Measurement: --  Triglycerides, Serum: 74

## 2023-08-13 NOTE — BH INPATIENT PSYCHIATRY ASSESSMENT NOTE - ATTENDING COMMENTS
29 yo female with depression, BPD admitted 9.39 with concerns for ongoing depressive symptoms, passive suicidality, paranoia, and referential thinking. On exam, patient with increased speech latency, with throught disorganization and circumstantiality, a flattening of affect, and demonstrating limited judgment. Pt unable to care for self and requires hospitalization for stability and treatment. Plan as above

## 2023-08-13 NOTE — BH INPATIENT PSYCHIATRY ASSESSMENT NOTE - CURRENT MEDICATION
MEDICATIONS  (STANDING):  potassium chloride    Tablet ER 40 milliEquivalent(s) Oral once    MEDICATIONS  (PRN):  diphenhydrAMINE 50 milliGRAM(s) Oral every 6 hours PRN Rash and/or Itching  diphenhydrAMINE Injectable 50 milliGRAM(s) IntraMuscular once PRN eps ppx  haloperidol     Tablet 5 milliGRAM(s) Oral every 6 hours PRN agitation  haloperidol    Injectable 5 milliGRAM(s) IntraMuscular Once PRN extreme agitation  LORazepam     Tablet 2 milliGRAM(s) Oral every 6 hours PRN Anxiety  LORazepam   Injectable 2 milliGRAM(s) IntraMuscular Once PRN extreme agitation

## 2023-08-13 NOTE — BH INPATIENT PSYCHIATRY ASSESSMENT NOTE - HPI (INCLUDE ILLNESS QUALITY, SEVERITY, DURATION, TIMING, CONTEXT, MODIFYING FACTORS, ASSOCIATED SIGNS AND SYMPTOMS)
Chart review and Patient was seen and evaluated, chart, medications and labs reviewed. Case discussed with nursing team.  On service for this 28 year old single female, no dependents.  Patient domiciles in private residence with family, currently unemployed.  Patient has PPH borderline personality disorder, depression, and anxiety. Patient has no history of  inpatient hospitalizations. Patient is hospitalized with a primary problem of paranoia, anxiety and worsening depression.   Patient admitted to [facility] on a 9.39 legal status. I have reviewed the initial psychiatric assessment in the electronic medical record, including the history of present illness, past psychiatric history, family/social history (no pertinent changes), and exam, and have confirmed the salient findings dated 23    As per chart review, transferring records indicated the followinF, living in private residence w/ family, single, no dependents, unemployed, past psychiatric history of borderline personality disorder, depression, and anxiety, no past psychiatric hospitalizations, history of multiple SAs (last a year and a half ago by unknown means), remote history of NSSIB by cutting, near-daily cannabis use, no other known substance, no history of legal issues/aggression/violence, PMH of anorexia nervosa, who was BIB for paranoia, anxiety, and depression.  Chart reviewed. Patient presents as vague, tangential, and thought blocked at times. Patient reports that she has been "struggling for a while." Patient describes worsening anxiety, which she describes as frequent worry, nervousness, restlessness, nausea, and panic attacks. She says she has been "ruminating /," but does not elaborate on her ruminations. She says over the last few weeks she has been increasingly scared due a feeling that people are watching her. She does not identify people who may be watching her. When asked if specific people are monitoring her, she says, "no... well ...yes," but does not discuss further. Denies feeling as if she is being monitored by the police, FBI, [employer], or other municipal/governmental agencies. Patient is quite vague and states repeatedly that she endured some sort of trauma recently but does not describe in detail. Patient says that as a result of said trauma, she "shut down," which she describes as feeling like she is not herself, cannot do things she used to do, accompanied by frequent sobbing and difficulty expressing her thoughts. Patient reports feeling at times like her face and body no longer belong to her. . Patient reports feeling depressed, w/ limited appetite, poor sleep (3-4 hours a night), and feelings of worthlessness, but denies hopelessness, anhedonia, and amotivation. Patient describes chronic passive thoughts of death but denies active S/I/I/P. Patient says she had spent weeks living out of car, due to issues w/ mother and brother, but recently returned to mother's home. Patient states she was brought in today by mother at recommendation of her aunt, but does not know why aunt was concerned. Describes her aunt as "controlling and toxic." Patient says she had planned to go to her best friend's wedding today, but felt too overwhelmed and returned home. She comments repeatedly that she wants to get better so she doesn't lose the "love of my life," however, is vague when discussing this relationship. Patient endorses paranoia and referential thinking (thinking certain things in the environment are somehow related to her), but denies auditory hallucinations, thought insertion, thought withdrawal, and thought broadcasting. Patient denies manic symptoms, such as grandiosity, decreased need for sleep, persistently elevated and/or irritable mood, and increase in goal-directed behavior. Endorses daily cannabis use but denies other substance use. Patient reports struggling w/ ADLs, including feeding herself (of note, patient w/ AN) and tending to personal hygiene. No H/I/I/P or V/I/I/P.   Collateral information obtained from patient's mother, Rashmi Mathis ([phone]). Per mother, patient left home in  due to paranoia that she was being monitored in the house. The patient then left out of her car until returning 7 weeks ago. Since coming home, patient has been hiding in closets and under beds out of fear she is being targeted. Daughter has also been despondent, w/ frequent unexplained sobbing. Mother says that since patient has been home, her PO intake has been very limited. Mother has seen her eat "maybe a few handfuls of Goldfish." Patient was diagnosed last week w/ cellulitis, however, has not been adherent w/ antibiotics. Mother says patient was supposed to go to best friend's wedding today, but returned home twice. Mother suspects this is related to issues patient is having w/ new significant other. Reports patient has been prescribed Zoloft and Adderall by an online provider; she does not believe the patient is adherent. Mother is concerned for her daughter's safety and is advocating for admission.    On unit: Chart review and Patient was seen and evaluated, chart, medications and labs reviewed. Case discussed with nursing team.  On service for this 28 year old single female, no dependents.  Patient domiciles in private residence with family, currently unemployed.  Patient has PPH borderline personality disorder, depression, and anxiety. Patient has no history of  inpatient hospitalizations. Patient is hospitalized with a primary problem of paranoia, anxiety and worsening depression.   Patient admitted to [facility] on a 9.39 legal status. I have reviewed the initial psychiatric assessment in the electronic medical record, including the history of present illness, past psychiatric history, family/social history (no pertinent changes), and exam, and have confirmed the salient findings dated 23    As per chart review, transferring records indicated the followinF, living in private residence w/ family, single, no dependents, unemployed, past psychiatric history of borderline personality disorder, depression, and anxiety, no past psychiatric hospitalizations, history of multiple SAs (last a year and a half ago by unknown means), remote history of NSSIB by cutting, near-daily cannabis use, no other known substance, no history of legal issues/aggression/violence, PMH of anorexia nervosa, who was BIB for paranoia, anxiety, and depression.  Chart reviewed. Patient presents as vague, tangential, and thought blocked at times. Patient reports that she has been "struggling for a while." Patient describes worsening anxiety, which she describes as frequent worry, nervousness, restlessness, nausea, and panic attacks. She says she has been "ruminating /," but does not elaborate on her ruminations. She says over the last few weeks she has been increasingly scared due a feeling that people are watching her. She does not identify people who may be watching her. When asked if specific people are monitoring her, she says, "no... well ...yes," but does not discuss further. Denies feeling as if she is being monitored by the police, FBI, [employer], or other municipal/governmental agencies. Patient is quite vague and states repeatedly that she endured some sort of trauma recently but does not describe in detail. Patient says that as a result of said trauma, she "shut down," which she describes as feeling like she is not herself, cannot do things she used to do, accompanied by frequent sobbing and difficulty expressing her thoughts. Patient reports feeling at times like her face and body no longer belong to her. . Patient reports feeling depressed, w/ limited appetite, poor sleep (3-4 hours a night), and feelings of worthlessness, but denies hopelessness, anhedonia, and amotivation. Patient describes chronic passive thoughts of death but denies active S/I/I/P. Patient says she had spent weeks living out of car, due to issues w/ mother and brother, but recently returned to mother's home. Patient states she was brought in today by mother at recommendation of her aunt, but does not know why aunt was concerned. Describes her aunt as "controlling and toxic." Patient says she had planned to go to her best friend's wedding today, but felt too overwhelmed and returned home. She comments repeatedly that she wants to get better so she doesn't lose the "love of my life," however, is vague when discussing this relationship. Patient endorses paranoia and referential thinking (thinking certain things in the environment are somehow related to her), but denies auditory hallucinations, thought insertion, thought withdrawal, and thought broadcasting. Patient denies manic symptoms, such as grandiosity, decreased need for sleep, persistently elevated and/or irritable mood, and increase in goal-directed behavior. Endorses daily cannabis use but denies other substance use. Patient reports struggling w/ ADLs, including feeding herself (of note, patient w/ AN) and tending to personal hygiene. No H/I/I/P or V/I/I/P.   Collateral information obtained from patient's mother, Rashmi Mathis ([phone]). Per mother, patient left home in  due to paranoia that she was being monitored in the house. The patient then left out of her car until returning 7 weeks ago. Since coming home, patient has been hiding in closets and under beds out of fear she is being targeted. Daughter has also been despondent, w/ frequent unexplained sobbing. Mother says that since patient has been home, her PO intake has been very limited. Mother has seen her eat "maybe a few handfuls of Goldfish." Patient was diagnosed last week w/ cellulitis, however, has not been adherent w/ antibiotics. Mother says patient was supposed to go to best friend's wedding today, but returned home twice. Mother suspects this is related to issues patient is having w/ new significant other. Reports patient has been prescribed Zoloft and Adderall by an online provider; she does not believe the patient is adherent. Mother is concerned for her daughter's safety and is advocating for admission.    On unit:  Patient is seen for borderline personality disorder, depression, and anxiety.  Patient is discussed with nursing team, no interval events.  Patient has been in fair behavioral control, no prns for aggression.  Patient is observed in interview room she is calm, cooperative with interview.    Discussed precipitants to warrant services, pt reports “I was not being myself my anxiety is worsening.”  Patient reports “my family member took what I said and ran with it”  When asked what she told family member, patient went on a tangent not really answering the question. Patient reports worsening mood but denies SI.    Patient also describes persistent anxiety. During interview patient presents disorganized tangential at times, mumbling and not making any sense. Told writer that she is here because of “crippling anxiety and inability to function” Patient denies any specific trigger.     Denies any current or past suicidal thoughts, or negative thoughts to self-harm. No thoughts to harm others.  Reveals past SIB via cutting and burning.    Client denies any hx or current AVH, delusions, psychotic disorganization, mind reading abilities, thought insertion, ideas of reference, special chávez, or thought broadcasting.  Does mention feelings of  paranoia, reports she felt she was being followed.  Pt states “I got nervous, but I was challenging these thoughts”     Denies history of aggression or violence. No access to firearm. Patient presents with symptoms suggestive of active shannan: (some grandiosity, racing thoughts/ increase in risk taking behavior (drug use daily cannabis), presents with  pressured speech, sleep disruption), no signs of catatonia (with no signs of mutism, negativism, stereotypy, echolalia, echopraxia, posturing or rigidity. He was alert and able to answer appropriately to questions during exam).. She denies obsessive, intrusive and persistent thoughts or compulsive, ritualistic acts are reported. Patient denies any active legal issues, is not currently under any kind of court supervision.   In regards to substance use, patient reports daily use of cannabis, vaping, admitting utox +THC. Reports history of eating disorder, no purging, but recent binging, BMI 18.6.  PMH: Anemia, bradycardia    Patient reports past medication trial: Celexa, cymbalta, Ativan, klonopin, atarax, abilify, wellbutrin, seroquel, trazodone, lamictal, neurontin, remeron

## 2023-08-13 NOTE — BH INPATIENT PSYCHIATRY ASSESSMENT NOTE - NSBHCHARTREVIEWVS_PSY_A_CORE FT
Vital Signs Last 24 Hrs  T(C): 36.4 (08-12-23 @ 19:05), Max: 36.6 (08-12-23 @ 18:12)  T(F): 97.6 (08-12-23 @ 19:05), Max: 97.8 (08-12-23 @ 18:12)  HR: 65 (08-12-23 @ 18:12) (65 - 66)  BP: 127/74 (08-12-23 @ 18:12) (114/77 - 127/74)  BP(mean): --  RR: 16 (08-12-23 @ 19:05) (16 - 17)  SpO2: 100% (08-12-23 @ 18:12) (100% - 100%)    Orthostatic VS  08-12-23 @ 19:05  Lying BP: --/-- HR: --  Sitting BP: 98/77 HR: 68  Standing BP: 102/80 HR: 72  Site: --  Mode: --   Vital Signs Last 24 Hrs  T(C): 36.5 (08-13-23 @ 08:45), Max: 36.6 (08-12-23 @ 18:12)  T(F): 97.7 (08-13-23 @ 08:45), Max: 97.8 (08-12-23 @ 18:12)  HR: 65 (08-12-23 @ 18:12) (65 - 66)  BP: 127/74 (08-12-23 @ 18:12) (114/77 - 127/74)  BP(mean): --  RR: 16 (08-12-23 @ 19:05) (16 - 17)  SpO2: 100% (08-12-23 @ 18:12) (100% - 100%)    Orthostatic VS  08-13-23 @ 08:45  Lying BP: --/-- HR: --  Sitting BP: 109/74 HR: 58  Standing BP: 110/83 HR: 65  Site: upper right arm  Mode: electronic  Orthostatic VS  08-12-23 @ 19:05  Lying BP: --/-- HR: --  Sitting BP: 98/77 HR: 68  Standing BP: 102/80 HR: 72  Site: --  Mode: --   Vital Signs Last 24 Hrs  T(C): 36.5 (08-13-23 @ 08:45), Max: 36.6 (08-12-23 @ 18:12)  T(F): 97.7 (08-13-23 @ 08:45), Max: 97.8 (08-12-23 @ 18:12)  HR: 65 (08-12-23 @ 18:12) (65 - 65)  BP: 127/74 (08-12-23 @ 18:12) (127/74 - 127/74)  BP(mean): --  RR: 16 (08-12-23 @ 19:05) (16 - 17)  SpO2: 100% (08-12-23 @ 18:12) (100% - 100%)    Orthostatic VS  08-13-23 @ 08:45  Lying BP: --/-- HR: --  Sitting BP: 109/74 HR: 58  Standing BP: 110/83 HR: 65  Site: upper right arm  Mode: electronic  Orthostatic VS  08-12-23 @ 19:05  Lying BP: --/-- HR: --  Sitting BP: 98/77 HR: 68  Standing BP: 102/80 HR: 72  Site: --  Mode: --

## 2023-08-13 NOTE — BH INPATIENT PSYCHIATRY ASSESSMENT NOTE - NSICDXBHSECONDARYDX_PSY_ALL_CORE
Borderline personality disorder   F60.3  Cannabis abuse   F12.10  Anxiety   F41.9  Noncompliance with treatment   Z91.199

## 2023-08-13 NOTE — BH INPATIENT PSYCHIATRY ASSESSMENT NOTE - DESCRIPTION
recently living out of car, now living w/ family, completed associate's degree, not employed, single, no dependents

## 2023-08-13 NOTE — BH INPATIENT PSYCHIATRY ASSESSMENT NOTE - NSBHASSESSSUMMFT_PSY_ALL_CORE
Patient is a 28 year old single female, no dependents.  Patient domiciles in private residence with family, currently unemployed.  Patient has PPH borderline personality disorder, depression, and anxiety. Patient has no history of  inpatient hospitalizations. Patient is hospitalized with a primary problem of paranoia, anxiety and worsening depression.   Patient admitted to [facility] on a 9.39 legal status.     Plan:  >Legal: 9.39  >Obs: Routine, no current SI. no need for CO, patient not expected to pose risk to self or others in controlled inpatient setting  >Psychiatric Meds:   PRN medications:  Ativan 2mg oral Q6HR PRN for agitation and anxiety.  Haldol 5mg oral Q6HR PRN for agitation.   Benadryl 50mg oral Q6HR PRN for agitation.   >Labs: Admission labs reviewed, Labs pending for tomorrow: A1c and Lipid panel. Hold antipsychotics if QTc >500  >Medical:  No acute concerns. No consultations needed at this time. No indication for CIWA. Patient with consistently stable VS, no visible physical symptoms of withdrawal.   During the course of treatment, will collaborate with medical team to manage medical issues.  >Diet: Regular  >Social: milieu/structured therapy  >Treatment Interventions: Groups and Individual Therapy/CBT, Motivational counseling for substance abuse related issues.   >Dispo: Collateral and dispo planning pending further symptom and medication optimization       Patient is a 28 year old single female, no dependents.  Patient domiciles in private residence with family, currently unemployed.  Patient has PPH borderline personality disorder, depression, and anxiety. Patient has no history of  inpatient hospitalizations. Patient is hospitalized with a primary problem of paranoia, anxiety and worsening depression.   Patient admitted to [facility] on a 9.39 legal status.     Plan:  >Legal: 9.39  >Obs: Routine, no current SI. no need for CO, patient not expected to pose risk to self or others in controlled inpatient setting  >Psychiatric Meds:   Consider Abilify/seroquel (pt wants to think about it and discuss with primary team)  PRN medications:  Ativan 2mg oral Q6HR PRN for agitation and anxiety.  Haldol 5mg oral Q6HR PRN for agitation.   Benadryl 50mg oral Q6HR PRN for agitation.   >Labs: Admission labs reviewed, Labs pending for tomorrow: A1c and Lipid panel. Hold antipsychotics if QTc >500  >Medical:  No acute concerns. No consultations needed at this time. No indication for CIWA. Patient with consistently stable VS, no visible physical symptoms of withdrawal.   During the course of treatment, will collaborate with medical team to manage medical issues.  >Diet: Regular  >Social: milieu/structured therapy  >Treatment Interventions: Groups and Individual Therapy/CBT, Motivational counseling for substance abuse related issues.   >Dispo: Collateral and dispo planning pending further symptom and medication optimization

## 2023-08-13 NOTE — BH INPATIENT PSYCHIATRY ASSESSMENT NOTE - NSBHATTESTBILLING_PSY_A_CORE
99285-Emergency department visit - high complexity 99222-Initial OBS or IP - moderate complexity OR 55-74 mins

## 2023-08-14 RX ORDER — HYDROXYZINE HCL 10 MG
50 TABLET ORAL EVERY 6 HOURS
Refills: 0 | Status: DISCONTINUED | OUTPATIENT
Start: 2023-08-14 | End: 2023-08-18

## 2023-08-14 RX ORDER — NICOTINE POLACRILEX 2 MG
2 GUM BUCCAL
Refills: 0 | Status: DISCONTINUED | OUTPATIENT
Start: 2023-08-14 | End: 2023-08-18

## 2023-08-14 RX ADMIN — Medication 2 MILLIGRAM(S): at 00:42

## 2023-08-14 RX ADMIN — Medication 2 MILLIGRAM(S): at 13:55

## 2023-08-14 RX ADMIN — Medication 2 MILLIGRAM(S): at 00:06

## 2023-08-14 RX ADMIN — Medication 2 MILLIGRAM(S): at 20:27

## 2023-08-14 NOTE — DIETITIAN INITIAL EVALUATION ADULT - OTHER INFO
Patient is a 29 y/o female with w/ PPHx of borderline personality disorder, anxiety, depression, and anorexia nervosa, no prior psych hospitalizations, hx of several prior SAs, hx of NSSIB by cutting and burning, hx of treatment in PHP ([facility] x2, [facility] x1), previously completed [facility] DBT program, hx of EtOH abuse (in remission) and current cannabis use 4-5x/week, BIB mother for worsening anxiety, mood lability, and paranoia in the context of recent prescription of Adderall, poor adherence to medication, and conflict with partner.    Met with patient in her room today. Patient reports her current appetite remains poor in setting of anxiety but she tries to eat, with po intake <50% on in house meals. States she is a 'picky-eater'. Menu options explored with patient today, food preferences taken and honored on CBoard. Patient continues to c/o nausea but no vomiting, denies triggered by food or worsened with food intake. Patient denies urge to restrict/binge/purge on the unit. Denies C/D. Writer encouraged po intake as tolerated, patient verbalized understanding and amenable to having [employer] vegan shake to optimize her PO intake.   Patient does not know her UBW or recent wt status, states she does not want to know her weight as it would make her anxious, prefers to have blind weight on the unit weekly, relayed message to RN. Wt hx per [facility] HIE: 72.6kg (Aug 2020), 72.6kg (Jun 2021). Current adm wt: 52.2kg (8/12/23). Wt loss over the past ~ 2yrs likely related to inadequate po intake. Will cont to monitor wt.

## 2023-08-14 NOTE — BH INPATIENT PSYCHIATRY PROGRESS NOTE - NSBHMETABOLIC_PSY_ALL_CORE_FT
BMI: BMI (kg/m2): 18.6 (08-12-23 @ 19:05)  HbA1c: A1C with Estimated Average Glucose Result: 4.6 % (08-13-23 @ 09:50)    Glucose:   BP: 127/74 (08-12-23 @ 18:12) (114/77 - 127/74)  Lipid Panel: Date/Time: 08-13-23 @ 09:50  Cholesterol, Serum: 188  Direct LDL: --  HDL Cholesterol, Serum: 60  Total Cholesterol/HDL Ration Measurement: --  Triglycerides, Serum: 74

## 2023-08-14 NOTE — PSYCHIATRIC REHAB INITIAL EVALUATION - NSBHPRRECOMMEND_PSY_ALL_CORE
Writer met with patient to orient to unit and introduce self, psychiatric rehabilitation staff and department functions. Patient was provided with a detailed breakdown of the unit schedule and psych rehab groups. Writer encouraged patient to attend psychiatric rehabilitation groups and engage in treatment. Pt is a 28 year old female, domiciled with family, single, no dependents, unemployed, with a past psychiatric hx of borderline personality disorder, depression, and anxiety. Pt reports going through PHP program and PACE twice and finding them helpful. Per chart, the pt has a hx of multiple past SA (last was a year and a half ago by unknown means). Pt has a remote hx of NSSIB by cutting. Pt has a hx of daily cannabis use, no other known substance use. Pt has no known hx of legal issues, aggression, or violence. Pt has a PMH of anorexia nervosa who was BIB for paranoia in the context of being watched, anxiety, and depression. During discussion with writer today, the pt was forthcoming with personal hx, tangential, and thought blocked at times. Pt became forgetful of the topic of conversation at moments. Per chart, the pt endorses symptoms of worsening anxiety in the context of rumination, worry, nervousness, restlessness, nausea, and panic attacks. Writer and patient were able to establish a collaborative psychiatric rehabilitation goal. Psychiatric Rehabilitation staff will continue to engage patient daily in order to develop therapeutic rapport.

## 2023-08-14 NOTE — BH INPATIENT PSYCHIATRY PROGRESS NOTE - NSICDXBHTERTIARYDX_PSY_ALL_CORE
R/O Delusions   F22   R/O Psychosis, unspecified psychosis type   F29  R/O Generalized anxiety disorder   F41.1

## 2023-08-14 NOTE — BH INPATIENT PSYCHIATRY PROGRESS NOTE - CURRENT MEDICATION
MEDICATIONS  (STANDING):  potassium chloride    Tablet ER 40 milliEquivalent(s) Oral once    MEDICATIONS  (PRN):  diphenhydrAMINE 50 milliGRAM(s) Oral every 6 hours PRN Rash and/or Itching  diphenhydrAMINE Injectable 50 milliGRAM(s) IntraMuscular once PRN eps ppx  haloperidol     Tablet 5 milliGRAM(s) Oral every 6 hours PRN agitation  haloperidol    Injectable 5 milliGRAM(s) IntraMuscular Once PRN extreme agitation  LORazepam     Tablet 2 milliGRAM(s) Oral every 6 hours PRN Anxiety  LORazepam   Injectable 2 milliGRAM(s) IntraMuscular Once PRN extreme agitation  nicotine  Polacrilex Gum 2 milliGRAM(s) Oral every 3 hours PRN nicotine dependence   MEDICATIONS  (STANDING):  potassium chloride    Tablet ER 40 milliEquivalent(s) Oral once    MEDICATIONS  (PRN):  diphenhydrAMINE 50 milliGRAM(s) Oral every 6 hours PRN Rash and/or Itching  diphenhydrAMINE Injectable 50 milliGRAM(s) IntraMuscular once PRN eps ppx  haloperidol     Tablet 5 milliGRAM(s) Oral every 6 hours PRN agitation  haloperidol    Injectable 5 milliGRAM(s) IntraMuscular Once PRN extreme agitation  hydrOXYzine hydrochloride 50 milliGRAM(s) Oral every 6 hours PRN anxiety  LORazepam     Tablet 2 milliGRAM(s) Oral every 6 hours PRN Agitation  LORazepam   Injectable 2 milliGRAM(s) IntraMuscular Once PRN extreme agitation  nicotine  Polacrilex Gum 2 milliGRAM(s) Oral every 3 hours PRN nicotine dependence   MEDICATIONS  (STANDING):  potassium chloride    Tablet ER 40 milliEquivalent(s) Oral once  sertraline 25 milliGRAM(s) Oral daily    MEDICATIONS  (PRN):  diphenhydrAMINE 50 milliGRAM(s) Oral every 6 hours PRN Rash and/or Itching  diphenhydrAMINE Injectable 50 milliGRAM(s) IntraMuscular once PRN eps ppx  haloperidol     Tablet 5 milliGRAM(s) Oral every 6 hours PRN agitation  haloperidol    Injectable 5 milliGRAM(s) IntraMuscular Once PRN extreme agitation  hydrOXYzine hydrochloride 50 milliGRAM(s) Oral every 6 hours PRN anxiety  LORazepam   Injectable 2 milliGRAM(s) IntraMuscular Once PRN extreme agitation  nicotine  Polacrilex Gum 2 milliGRAM(s) Oral every 3 hours PRN nicotine dependence

## 2023-08-14 NOTE — BH INPATIENT PSYCHIATRY PROGRESS NOTE - NSBHASSESSSUMMFT_PSY_ALL_CORE
28 y.o. F patient, single, no dependents, recently domiciled in car, employed as  (has not attended work for past 3 wks), PPH of BPD, anxiety, depression, and anorexia, no past inpatient psych hospitalizations, multiple prior SA's, NSSIB by cutting and burning, + cannabis use 4-5x/week, BIB for acute anxiety and "panic attack" in the context of not being able to attend her friend's wedding due to "relationship trouble" and feeling like she is being "watched in the house".     On assessment, pt. presents with severe anxiety and restlessness. Throughout the interview, she required frequent redirection and more direct questions as she exhibited tangential thought process and self-reported difficulty expressing her thoughts and feelings. She expresses a fear of being watched in her home, including the belief that her phone and computer are hacked     Pt is a a 28 single woman who lives with her mother and brother (though recently living in her car for several weeks), employed as  (though has not attended work for past 3 wks), w/ PPHx of borderline personality disorder, anxiety, depression, and anorexia nervosa, no prior psych hospitalizations, hx of several prior SAs, hx of NSSIB by cutting and burning, hx of treatment in PHP ([facility] x2, [facility] x1), previously completed [facility] DBT program, hx of EtOH abuse (in remission) and current cannabis use 4-5x/week, BIB mother for worsening anxiety, mood lability, and paranoia in the context of recent prescription of Adderall, poor adherence to medication, and conflict with partner.    Pt presents with severe anxiety, dysregulated mood, poor PO intake, poor sleep, and intense paranoia regarding surveillance and hacking of her devices (resulting in pt living out of her car for several weeks recently). She is often vague and quite tangential, requiring frequent redirection. DDx at the moment is wide and includes BPD, EMMA, psychosis NOS, substance-induced psychosis (re: Adderall and marijuana).     Plan:       Pt is a a 28 single woman who lives with her mother and brother (though recently living in her car for several weeks), employed as  (though has not attended work for past 3 wks), w/ PPHx of borderline personality disorder, anxiety, depression, and anorexia nervosa, no prior psych hospitalizations, hx of several prior SAs, hx of NSSIB by cutting and burning, hx of treatment in PHP ([facility] x2, [facility] x1), previously completed [facility] DBT program, hx of EtOH abuse (in remission) and current cannabis use 4-5x/week, BIB mother for worsening anxiety, mood lability, and paranoia in the context of recent prescription of Adderall, poor adherence to medication, and conflict with partner.    Pt presents with severe anxiety, dysregulated mood, poor PO intake, poor sleep, and intense paranoia regarding surveillance and hacking of her devices (resulting in pt living out of her car for several weeks recently). She is often vague and quite tangential, requiring frequent redirection. DDx at the moment is wide and includes BPD, EMMA, psychosis NOS, substance-induced psychosis (re: Adderall and marijuana).     Plan:  -Psychiatric: Restart Zoloft 50 mg PO QD for anxiety  	-Atarax 50 mg PO PRN & Benadryl 50 mg PO PRN for anxiety  	-Ativan 2 mg PRN, Haldol 5 mg PRN, Benadryl 50 mg PRN for agitation  -Medical: Pt. w/ hx of anorexia, recently hospitalized for dehydration  	-Monitor vitals (pt. bradycardic/hypotensive)  	-f/u nutrition consult  -I/G/M: as appropriate  -Collateral: pt. refused collateral from family. Will call back psych NP Astrid Johnson tomorrow am (8/15)  -Dispo: pt. considering [facility], referral was placed.       Pt is a a 28 single woman who lives with her mother and brother (though recently living in her car for several weeks), employed as  (though has not attended work for past 3 wks), w/ PPHx of borderline personality disorder, anxiety, depression, and anorexia nervosa, no prior psych hospitalizations, hx of several prior SAs, hx of NSSIB by cutting and burning, hx of treatment in PHP ([facility] x2, [facility] x1), previously completed [facility] DBT program, hx of EtOH abuse (in remission) and current cannabis use 4-5x/week, BIB mother for worsening anxiety, mood lability, and paranoia in the context of recent prescription of Adderall, poor adherence to medication, and conflict with partner.    Pt presents with severe anxiety, dysregulated mood, poor PO intake, poor sleep, and intense paranoia regarding surveillance and hacking of her devices (resulting in pt living out of her car for several weeks recently). She is often vague and quite tangential, requiring frequent redirection. DDx at the moment is wide and includes BPD, EMMA, psychosis NOS, substance-induced psychosis (re: Adderall and marijuana).     Plan:  -Psychiatric: Restart Zoloft 25mg PO QD for anxiety  	-Atarax 50 mg PO PRN & Benadryl 50 mg PO PRN for anxiety  	-Ativan 2 mg PRN, Haldol 5 mg PRN, Benadryl 50 mg PRN for agitation  -Medical: Pt. w/ hx of anorexia, recently hospitalized for dehydration  	-Monitor vitals (pt. bradycardic/hypotensive)  	-f/u nutrition consult  -I/G/M: as appropriate  -Collateral: pt. refused collateral from family. Will call back psych NP Astrid Johnson tomorrow am (8/15)  -Dispo: pt. considering [facility], referral was placed.

## 2023-08-14 NOTE — BH INPATIENT PSYCHIATRY PROGRESS NOTE - MSE UNSTRUCTURED FT
The patient appears stated age, fair hygiene and dressed appropriately w/ blanket wrapped around her.  The patient was anxious, cooperative with the interview and maintained appropriate eye contact. Psychomotor agitation noted (fidgeting, standing up abruptly).  Steady gait observed.  The patient's speech was fluent, normal in tone, rate, and volume.  The patient's mood is "anxious". Affect is anxious, constricted, stable and congruent to mood.  The patient's thoughts are tangential.  Denies any hallucinations but endorses delusional belief of her phone being hacked and being watched in the house. Denies any suicidal or homicidal ideation, intent, or plan.  Insight is fair.  Judgment is limited.  Impulse control has been fair on the unit. The patient appears stated age, fair hygiene and dressed appropriately w/ blanket wrapped around her.  The patient was anxious, cooperative with the interview and maintained appropriate eye contact. Psychomotor agitation noted (fidgeting, standing up abruptly).  Steady gait observed.  The patient's speech was fluent, normal in tone, rate, and volume.  The patient's mood is "anxious". Affect is anxious, tearful at times, irritable at times, labile. TP is tangential and overinclusive. TC: +persecutory and referential delusions involving hacking of devices and surveillance, preoccupied with discharge, discusses long-standing anxiety and mood dysregulation, +poverty of content at times, denies SIIP and HIIP. Perception: denies AH and VH, does not appear to be responding to internal stimuli.  Insight is limited.  Judgment is poor.  Impulse control has been fair on the unit, though a bit tenuous.

## 2023-08-14 NOTE — DIETITIAN INITIAL EVALUATION ADULT - PERTINENT MEDS FT
MEDICATIONS  (STANDING):  potassium chloride    Tablet ER 40 milliEquivalent(s) Oral once    MEDICATIONS  (PRN):  diphenhydrAMINE 50 milliGRAM(s) Oral every 6 hours PRN Rash and/or Itching  diphenhydrAMINE Injectable 50 milliGRAM(s) IntraMuscular once PRN eps ppx  haloperidol     Tablet 5 milliGRAM(s) Oral every 6 hours PRN agitation  haloperidol    Injectable 5 milliGRAM(s) IntraMuscular Once PRN extreme agitation  LORazepam     Tablet 2 milliGRAM(s) Oral every 6 hours PRN Anxiety  LORazepam   Injectable 2 milliGRAM(s) IntraMuscular Once PRN extreme agitation  nicotine  Polacrilex Gum 2 milliGRAM(s) Oral every 3 hours PRN nicotine dependence

## 2023-08-14 NOTE — BH INPATIENT PSYCHIATRY PROGRESS NOTE - PRN MEDS
MEDICATIONS  (PRN):  diphenhydrAMINE 50 milliGRAM(s) Oral every 6 hours PRN Rash and/or Itching  diphenhydrAMINE Injectable 50 milliGRAM(s) IntraMuscular once PRN eps ppx  haloperidol     Tablet 5 milliGRAM(s) Oral every 6 hours PRN agitation  haloperidol    Injectable 5 milliGRAM(s) IntraMuscular Once PRN extreme agitation  LORazepam     Tablet 2 milliGRAM(s) Oral every 6 hours PRN Anxiety  LORazepam   Injectable 2 milliGRAM(s) IntraMuscular Once PRN extreme agitation  nicotine  Polacrilex Gum 2 milliGRAM(s) Oral every 3 hours PRN nicotine dependence   MEDICATIONS  (PRN):  diphenhydrAMINE 50 milliGRAM(s) Oral every 6 hours PRN Rash and/or Itching  diphenhydrAMINE Injectable 50 milliGRAM(s) IntraMuscular once PRN eps ppx  haloperidol     Tablet 5 milliGRAM(s) Oral every 6 hours PRN agitation  haloperidol    Injectable 5 milliGRAM(s) IntraMuscular Once PRN extreme agitation  hydrOXYzine hydrochloride 50 milliGRAM(s) Oral every 6 hours PRN anxiety  LORazepam     Tablet 2 milliGRAM(s) Oral every 6 hours PRN Agitation  LORazepam   Injectable 2 milliGRAM(s) IntraMuscular Once PRN extreme agitation  nicotine  Polacrilex Gum 2 milliGRAM(s) Oral every 3 hours PRN nicotine dependence   MEDICATIONS  (PRN):  diphenhydrAMINE 50 milliGRAM(s) Oral every 6 hours PRN Rash and/or Itching  diphenhydrAMINE Injectable 50 milliGRAM(s) IntraMuscular once PRN eps ppx  haloperidol     Tablet 5 milliGRAM(s) Oral every 6 hours PRN agitation  haloperidol    Injectable 5 milliGRAM(s) IntraMuscular Once PRN extreme agitation  hydrOXYzine hydrochloride 50 milliGRAM(s) Oral every 6 hours PRN anxiety  LORazepam   Injectable 2 milliGRAM(s) IntraMuscular Once PRN extreme agitation  nicotine  Polacrilex Gum 2 milliGRAM(s) Oral every 3 hours PRN nicotine dependence

## 2023-08-14 NOTE — DIETITIAN INITIAL EVALUATION ADULT - PERTINENT LABORATORY DATA
08-13    139  |  99  |  6<L>  ----------------------------<  84  3.8   |  26  |  0.63    Ca    9.6      13 Aug 2023 09:50    TPro  8.0  /  Alb  4.7  /  TBili  1.2  /  DBili  x   /  AST  23  /  ALT  18  /  AlkPhos  72  08-13  A1C with Estimated Average Glucose Result: 4.6 % (08-13-23 @ 09:50)    Lipid Panel: Date/Time: 08-13-23 @ 09:50  Cholesterol, Serum: 188  Direct LDL: --  HDL Cholesterol, Serum: 60  Total Cholesterol/HDL Ration Measurement: --  Triglycerides, Serum: 74

## 2023-08-14 NOTE — DIETITIAN INITIAL EVALUATION ADULT - ORAL INTAKE PTA/DIET HISTORY
Patient endorses a hx of anorexia nervosa since age of ~17 y/o (~10 years ago) with restricting, binging and excessive physical exercises. States she had followed with an outpatient RD for her eating disorder in the past, with improvement on her overall eating for the past few years. Reports recent decreased appetite & PO intake due to anxiety and having nausea every 1-2 days. NKFA reported. Follows a lacto-ovo vegetarian diet. Only takes elderberry supplements at home and not on any other po nutrition supplements.

## 2023-08-14 NOTE — BH INPATIENT PSYCHIATRY PROGRESS NOTE - NSBHFUPINTERVALHXFT_PSY_A_CORE
Chart reviewed, case d/w interdisciplinary team, no acute events overnight. Ativan 2 mg PRN PO x 2 requested last night for anxiety with good effect. Pt. reports decreased PO intake 2/2 poor appetite. Pt. reports feeling extremely nervous and anxious. She reports worrying that her phone is hacked and people were watching her in her house, but feels like these fears are irrational. She did not want to disclose who is watching her when asked. She also reports difficulty with expressing her thoughts and emotions, feeling like she isn't expressing them on the outside the way they are in her head. Pt. denied SI/HI/AH/VH Chart reviewed, case d/w interdisciplinary team, no acute events overnight. Ativan 2 mg PRN PO x 2 requested last night for anxiety with good effect. Pt. reports decreased PO intake 2/2 poor appetite (acknowledges prior hx of eating disorder though says this is in remission). Pt. reports feeling extremely nervous and anxious. She reports worrying that her phone is hacked and people were watching her in her house, but feels like these fears are irrational. She did not want to disclose who is watching her when asked. She denies AH, VH, thought-broadcasting/insertion/withdrawal. She also repeatedly reports difficulty with expressing her thoughts and emotions, feeling like she isn't expressing them on the outside the way they are in her head. Suggests that she feels a loss of control of her mind and body though denies feeling as if someone is in fact controlling her. Discusses chronic conflict with her mother. Pt noted to be very discharge-focused and discusses plans to seek out ACT therapy and a spirtual healer. Denies SIIP or urges to self-harm. Denies HIIP. Later pt observed to be very tearful on the phone for quite some time.

## 2023-08-15 ENCOUNTER — OUTPATIENT (OUTPATIENT)
Dept: OUTPATIENT SERVICES | Facility: HOSPITAL | Age: 28
LOS: 1 days | Discharge: HOME | End: 2023-08-15

## 2023-08-15 RX ORDER — SERTRALINE 25 MG/1
25 TABLET, FILM COATED ORAL DAILY
Refills: 0 | Status: DISCONTINUED | OUTPATIENT
Start: 2023-08-15 | End: 2023-08-15

## 2023-08-15 RX ORDER — SERTRALINE 25 MG/1
50 TABLET, FILM COATED ORAL DAILY
Refills: 0 | Status: DISCONTINUED | OUTPATIENT
Start: 2023-08-15 | End: 2023-08-18

## 2023-08-15 RX ADMIN — SERTRALINE 25 MILLIGRAM(S): 25 TABLET, FILM COATED ORAL at 09:16

## 2023-08-15 RX ADMIN — Medication 2 MILLIGRAM(S): at 03:10

## 2023-08-15 NOTE — BH INPATIENT PSYCHIATRY PROGRESS NOTE - NSBHMETABOLIC_PSY_ALL_CORE_FT
BMI: BMI (kg/m2): 18.6 (08-12-23 @ 19:05)  HbA1c: A1C with Estimated Average Glucose Result: 4.6 % (08-13-23 @ 09:50)    Glucose:   BP: 127/74 (08-12-23 @ 18:12) (114/77 - 127/74)  Lipid Panel: Date/Time: 08-13-23 @ 09:50  Cholesterol, Serum: 188  Direct LDL: --  HDL Cholesterol, Serum: 60  Total Cholesterol/HDL Ration Measurement: --  Triglycerides, Serum: 74   BMI: BMI (kg/m2): 18.6 (08-12-23 @ 19:05)  HbA1c: A1C with Estimated Average Glucose Result: 4.6 % (08-13-23 @ 09:50)    Glucose:   BP: 127/74 (08-12-23 @ 18:12) (127/74 - 127/74)  Lipid Panel: Date/Time: 08-13-23 @ 09:50  Cholesterol, Serum: 188  Direct LDL: --  HDL Cholesterol, Serum: 60  Total Cholesterol/HDL Ration Measurement: --  Triglycerides, Serum: 74

## 2023-08-15 NOTE — BH PSYCHOLOGY - CLINICIAN PSYCHOTHERAPY NOTE - NSBHPSYCHOLGOALS_PSY_A_CORE
Decrease symptoms/Assessment/Improve level of independent functioning/Improve social/vocational/coping skills/Prevent relapse/Psychoeducation

## 2023-08-15 NOTE — BH INPATIENT PSYCHIATRY PROGRESS NOTE - MSE UNSTRUCTURED FT
The patient appears stated age, fair hygiene and dressed appropriately w/ blanket wrapped around her.  The patient was anxious, cooperative with the interview and maintained appropriate eye contact. Psychomotor agitation noted to be improved.  Steady gait observed.  The patient's speech was fluent, normal in tone, rate, and volume.  The patient's mood is "anxious". Affect is anxious, tearful at times, irritable at times though less than on 8/14, labile. TP is more linear today though still overinclusive. TC: +persecutory and referential delusions involving hacking of devices and surveillance, though today pt. holds delusions less fully, preoccupied with discharge, discusses long-standing anxiety and mood dysregulation, +poverty of content at times, denies SIIP and HIIP. Perception: denies AH and VH, does not appear to be responding to internal stimuli.  Insight is limited.  Judgment is poor.  Impulse control has been fair on the unit, though a bit tenuous. The patient appears stated age, fair hygiene and dressed appropriately w/ blanket wrapped around her.  The patient was anxious, cooperative with the interview and maintained appropriate eye contact. Psychomotor agitation noted to be improved.  Steady gait observed.  The patient's speech was fluent, normal in tone, rate, and volume.  The patient's mood is "anxious". Affect is anxious, tearful at times, less irritable, labile. TP is more linear today though still overinclusive. TC: +persecutory and referential delusions involving hacking of devices and surveillance though delusions are much less intense/less delusional conviction, preoccupied with discharge, discusses long-standing anxiety and mood dysregulation, denies SIIP and HIIP. Perception: denies AH and VH, does not appear to be responding to internal stimuli.  Insight is limited.  Judgment is poor.  Impulse control has been fair on the unit, though a bit tenuous.

## 2023-08-15 NOTE — BH SOCIAL WORK INITIAL PSYCHOSOCIAL EVALUATION - OTHER PAST PSYCHIATRIC HISTORY (INCLUDE DETAILS REGARDING ONSET, COURSE OF ILLNESS, INPATIENT/OUTPATIENT TREATMENT)
Patient is 28F, living in private residence w/ family, single, no dependents, unemployed. Patient has PPH of borderline personality disorder, depression, and anxiety. No past psychiatric hospitalizations but has attended 3 PHPs (2 at [facility] in 2019 and 2022 and 1 at [facility]) and the [id number]. She has a history of many past medication trials Patient has history of multiple SAs (last attempt ~2022 by unknown means), remote history of NSSIB by cutting. No history of legal issues/aggression/violence. PMH of anorexia nervosa. Pt was BIB for paranoia, anxiety, and depression.

## 2023-08-15 NOTE — BH INPATIENT PSYCHIATRY PROGRESS NOTE - NSBHCHARTREVIEWVS_PSY_A_CORE FT
Vital Signs Last 24 Hrs  T(C): 36.8 (08-15-23 @ 08:13), Max: 36.8 (08-14-23 @ 18:38)  T(F): 98.3 (08-15-23 @ 08:13), Max: 98.3 (08-15-23 @ 08:13)  HR: --  BP: --  BP(mean): --  RR: --  SpO2: --    Orthostatic VS  08-15-23 @ 08:13  Lying BP: --/-- HR: --  Sitting BP: 109/80 HR: 82  Standing BP: --/-- HR: --  Site: --  Mode: --  Orthostatic VS  08-14-23 @ 18:38  Lying BP: --/-- HR: --  Sitting BP: 113/64 HR: 84  Standing BP: --/-- HR: --  Site: --  Mode: --  Orthostatic VS  08-14-23 @ 14:30  Lying BP: --/-- HR: --  Sitting BP: 96/60 HR: 84  Standing BP: --/-- HR: --  Site: --  Mode: auscultated w/ stethoscope  Orthostatic VS  08-14-23 @ 08:18  Lying BP: --/-- HR: --  Sitting BP: 96/67 HR: 51  Standing BP: --/-- HR: --  Site: --  Mode: --  Orthostatic VS  08-13-23 @ 20:16  Lying BP: --/-- HR: --  Sitting BP: 117/73 HR: 85  Standing BP: 112/78 HR: 92  Site: --  Mode: --

## 2023-08-15 NOTE — BH PSYCHOLOGY - CLINICIAN PSYCHOTHERAPY NOTE - NSBHPSYCHOLNARRATIVE_PSY_A_CORE FT
Psychology Intern met with patient for an individual supportive therapy session to discuss patient’s therapeutic goals. The writer explained the format of individual therapy on the unit as well as the limits of confidentiality; patient verbally confirmed understanding the limits of confidentiality. Pt reported feeling frustrated, scared, controlled, sad and worried. Pt tearfully expressed gratitude for meeting and repeatedly asked the writer how long she should expect to be on the inpt unit. Pt expressed symptoms of depression and anxiety, including intense feelings of persistent sadness as well as feeling “extreme pressure on everything I do.” Pt was tangential at times, answering close-ended questions with very long descriptions and tangents. Pt was tearful throughout session and required multiple redirections to discuss goals for the treatment. Pt expressed great difficulty in expressing her emotions and thoughts appropriately to others and reported feeling a “disconnect” between her inner feelings and her presentation. Pt and writer agreed to work on pt’s communication skills and emotion regulation skills while on the unit. Writer provided psychoeducation on communication skills and pt and writer practiced grounding skills together. When asked if the pt would like to work on any other goals, pt requested that the team “break” her and began crying. Writer offered validation of pt’s distress and words of encouragement.    Patient appeared appropriately attired, adequately groomed. Patient reported mood as "frustrated, scared and sad." Sleep was reported to have been poor (“impossible”). Appetite was noted to be poor. Patient reported medication compliance/noncompliance as well as "none" in terms of side effects with medication. Patient did not endorse Auditory Hallucinations; patient did not endorse Visual Hallucinations. Pt did not endorse suicidal or homicidal ideation/plan/intent. Patient agreed to inform staff should the patient experience suicidal or homicidal ideation/plant/intent. Gait was normal. Patient was observed to be frenetic, constant hand/foot/body movements, fidgety, restless.    Patient's attitude was pleasant and cooperative, disorganized. Eye contact was appropriate. Speech was pressured and understandable. Tone was normal. Rate was increased.    Affect was congruent and labile. Thought process (observed) was rambling, looseness of association, racing thoughts, disorganized. Sensorium was noted to be awake and alert. Concentration was poor. Attention span was distractible. Impulse control was impaired. Insight was impaired. Judgment was poor.

## 2023-08-15 NOTE — BH SOCIAL WORK INITIAL PSYCHOSOCIAL EVALUATION - NSPTSTATEDGOAL_PSY_ALL_CORE
"I would like therapy with an ACT (Acceptance & Commitment Therapy) therapist, and to go to [place] and do spiritual healing."

## 2023-08-15 NOTE — BH INPATIENT PSYCHIATRY PROGRESS NOTE - NSBHFUPINTERVALHXFT_PSY_A_CORE
Chart reviewed, case d/w interdisciplinary team, no acute events overnight. Adherent with standing medication. Pt. reports eating breakfast and taking ensure. Pt. reports feeling extremely nervous and anxious, but displayed improved emotion regulation during the interview. Pt continues to be very discharge-focused, mentioning that she feels she could do better in "more intensive therapy", though did not elaborate what that meant, and discusses plans to seek out ACT therapy and a spirtual healer. She is amenable to PHP. Denies SIIP or urges to self-harm. Denies HIIP. Pt. revealed rash on thigh for which she was prescribed antibiotics, to be restarted. Chart reviewed, case d/w interdisciplinary team, no acute events overnight. Adherent with standing medication. Pt. reports eating breakfast and taking ensure. Pt. reports feeling extremely nervous and anxious, but displayed improved emotion regulation during the interview. Pt continues to be very discharge-focused, mentioning that she feels she could do better in "more intensive therapy", though did not elaborate what that meant, and discusses plans to seek out ACT therapy and a spirtual healer. She is amenable to PHP. Denies SIIP or urges to self-harm. Denies HIIP. Reports ongoing belief of hacking/survelliance though now with much less intensity. Pt. revealed rash on thigh for which she was prescribed antibiotics, to be restarted.

## 2023-08-15 NOTE — BH SOCIAL WORK INITIAL PSYCHOSOCIAL EVALUATION - NSCMSPTSTRENGTHS_PSY_ALL_CORE
Able to adapt/Assertive/Compliance to treatment/Expressive of emotions/Future/goal oriented/Highly motivated for treatment/Intact employment/Resourceful/Self-reliant

## 2023-08-15 NOTE — BH INPATIENT PSYCHIATRY PROGRESS NOTE - NSBHASSESSSUMMFT_PSY_ALL_CORE
Pt is a a 28 single woman who lives with her mother and brother (though recently living in her car for several weeks), employed as  (though has not attended work for past 3 wks), w/ PPHx of borderline personality disorder, anxiety, depression, and anorexia nervosa, no prior psych hospitalizations, hx of several prior SAs, hx of NSSIB by cutting and burning, hx of treatment in PHP ([facility] x2, [facility] x1), previously completed [facility] DBT program, hx of EtOH abuse (in remission) and current cannabis use 4-5x/week, BIB mother for worsening anxiety, mood lability, and paranoia in the context of recent prescription of Adderall, poor adherence to medication, and conflict with partner.    Pt presents with anxiety, dysregulated mood, improved PO intake, improved sleep, and remains with some paranoia regarding surveillance and hacking of her devices (though pt. describes reality testing these beliefs). She is often vague and overinclusive, requiring some redirection though less than yesterday 8/14. These sxs are most likely explained by chronic anxiety and borderline personality disorder. Pt. is motivated for treatment and participating in group and individual therapy.     Plan:  -Psychiatric: C/w Zoloft 25mg PO QD for anxiety  	-Atarax 50 mg PO PRN & Benadryl 50 mg PO PRN for anxiety  	-Ativan 2 mg PRN, Haldol 5 mg PRN, Benadryl 50 mg PRN for agitation  -Medical: Pt. w/ rash (possibly cellulitis), Pt. w/ hx of anorexia, recently hospitalized for dehydration  	-restart clindamycin 300 mg PO Q6 x 5 days  	-Monitor vitals  	-f/u nutrition consult  -I/G/M: as appropriate  -Collateral: pt. refused collateral from family. Will call back psych NP Astrid Johnson tomorrow am   -Dispo: pt. considering [facility], referral was placed.       Pt is a a 28 single woman who lives with her mother and brother (though recently living in her car for several weeks), employed as  (though has not attended work for past 3 wks), w/ PPHx of borderline personality disorder, anxiety, depression, and anorexia nervosa, no prior psych hospitalizations, hx of several prior SAs, hx of NSSIB by cutting and burning, hx of treatment in PHP ([facility] x2, [facility] x1), previously completed [facility] DBT program, hx of EtOH abuse (in remission) and current cannabis use 4-5x/week, BIB mother for worsening anxiety, mood lability, and paranoia in the context of recent prescription of Adderall, poor adherence to medication, and conflict with partner.    Pt presents with anxiety, dysregulated mood, improved PO intake, improved sleep, and remains with some paranoia regarding surveillance and hacking of her devices (though pt. describes reality testing these beliefs). She is often vague and overinclusive, requiring some redirection though less than yesterday 8/14. These sxs are most likely explained by chronic anxiety and borderline personality disorder. Pt. is motivated for treatment and participating in group and individual therapy.     Plan:  -Psychiatric: Titrate Zoloft to 50mg PO QD for anxiety  	-Atarax 50 mg PO PRN & Benadryl 50 mg PO PRN for anxiety  	-Ativan 2 mg PRN, Haldol 5 mg PRN, Benadryl 50 mg PRN for agitation  -Medical: Pt. w/ rash (possibly cellulitis), Pt. w/ hx of anorexia, recently hospitalized for dehydration  	-restart clindamycin 300 mg PO Q6 x 5 days  	-Monitor vitals  	-f/u nutrition consult  -I/G/M: as appropriate  -Collateral: pt. refused collateral from family. Will call back psych NP Astrid Johnson tomorrow am   -Dispo: pt. considering [facility], referral was placed.

## 2023-08-15 NOTE — BH INPATIENT PSYCHIATRY PROGRESS NOTE - PRN MEDS
MEDICATIONS  (PRN):  diphenhydrAMINE 50 milliGRAM(s) Oral every 6 hours PRN Rash and/or Itching  diphenhydrAMINE Injectable 50 milliGRAM(s) IntraMuscular once PRN eps ppx  haloperidol     Tablet 5 milliGRAM(s) Oral every 6 hours PRN agitation  haloperidol    Injectable 5 milliGRAM(s) IntraMuscular Once PRN extreme agitation  hydrOXYzine hydrochloride 50 milliGRAM(s) Oral every 6 hours PRN anxiety  LORazepam   Injectable 2 milliGRAM(s) IntraMuscular Once PRN extreme agitation  nicotine  Polacrilex Gum 2 milliGRAM(s) Oral every 3 hours PRN nicotine dependence

## 2023-08-15 NOTE — BH PSYCHOLOGY - CLINICIAN PSYCHOTHERAPY NOTE - TOKEN PULL-DIAGNOSIS
Primary Diagnosis:  Borderline personality disorder [F60.3]      Anxiety [F41.9]      MDD (major depressive disorder) [F32.9]        Problem Dx:   Cannabis abuse [F12.10]
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
## 2023-08-15 NOTE — BH INPATIENT PSYCHIATRY PROGRESS NOTE - CURRENT MEDICATION
MEDICATIONS  (STANDING):  potassium chloride    Tablet ER 40 milliEquivalent(s) Oral once  sertraline 25 milliGRAM(s) Oral daily    MEDICATIONS  (PRN):  diphenhydrAMINE 50 milliGRAM(s) Oral every 6 hours PRN Rash and/or Itching  diphenhydrAMINE Injectable 50 milliGRAM(s) IntraMuscular once PRN eps ppx  haloperidol     Tablet 5 milliGRAM(s) Oral every 6 hours PRN agitation  haloperidol    Injectable 5 milliGRAM(s) IntraMuscular Once PRN extreme agitation  hydrOXYzine hydrochloride 50 milliGRAM(s) Oral every 6 hours PRN anxiety  LORazepam   Injectable 2 milliGRAM(s) IntraMuscular Once PRN extreme agitation  nicotine  Polacrilex Gum 2 milliGRAM(s) Oral every 3 hours PRN nicotine dependence

## 2023-08-16 LAB
APPEARANCE UR: CLEAR — SIGNIFICANT CHANGE UP
BACTERIA # UR AUTO: ABNORMAL /HPF
BILIRUB UR-MCNC: NEGATIVE — SIGNIFICANT CHANGE UP
CAST: 0 /LPF — SIGNIFICANT CHANGE UP (ref 0–4)
COLOR SPEC: YELLOW — SIGNIFICANT CHANGE UP
DIFF PNL FLD: NEGATIVE — SIGNIFICANT CHANGE UP
GLUCOSE UR QL: NEGATIVE MG/DL — SIGNIFICANT CHANGE UP
KETONES UR-MCNC: NEGATIVE MG/DL — SIGNIFICANT CHANGE UP
LEUKOCYTE ESTERASE UR-ACNC: ABNORMAL
NITRITE UR-MCNC: NEGATIVE — SIGNIFICANT CHANGE UP
PH UR: 7.5 — SIGNIFICANT CHANGE UP (ref 5–8)
PROT UR-MCNC: NEGATIVE MG/DL — SIGNIFICANT CHANGE UP
RBC CASTS # UR COMP ASSIST: 2 /HPF — SIGNIFICANT CHANGE UP (ref 0–4)
REVIEW: SIGNIFICANT CHANGE UP
SP GR SPEC: 1.01 — SIGNIFICANT CHANGE UP (ref 1–1.03)
SQUAMOUS # UR AUTO: 11 /HPF — HIGH (ref 0–5)
UROBILINOGEN FLD QL: 0.2 MG/DL — SIGNIFICANT CHANGE UP (ref 0.2–1)
WBC UR QL: 9 /HPF — HIGH (ref 0–5)

## 2023-08-16 PROCEDURE — 90853 GROUP PSYCHOTHERAPY: CPT

## 2023-08-16 RX ORDER — NITROFURANTOIN MACROCRYSTAL 50 MG
100 CAPSULE ORAL
Refills: 0 | Status: DISCONTINUED | OUTPATIENT
Start: 2023-08-16 | End: 2023-08-18

## 2023-08-16 RX ADMIN — Medication 50 MILLIGRAM(S): at 00:17

## 2023-08-16 RX ADMIN — Medication 50 MILLIGRAM(S): at 22:41

## 2023-08-16 RX ADMIN — SERTRALINE 50 MILLIGRAM(S): 25 TABLET, FILM COATED ORAL at 08:09

## 2023-08-16 RX ADMIN — Medication 2 MILLIGRAM(S): at 18:36

## 2023-08-16 RX ADMIN — Medication 2 MILLIGRAM(S): at 00:18

## 2023-08-16 RX ADMIN — Medication 100 MILLIGRAM(S): at 20:13

## 2023-08-16 NOTE — BH INPATIENT PSYCHIATRY PROGRESS NOTE - NSBHCHARTREVIEWVS_PSY_A_CORE FT
Vital Signs Last 24 Hrs  T(C): 36.4 (08-16-23 @ 08:20), Max: 36.4 (08-16-23 @ 08:20)  T(F): 97.5 (08-16-23 @ 08:20), Max: 97.5 (08-16-23 @ 08:20)  HR: --  BP: --  BP(mean): --  RR: --  SpO2: --    Orthostatic VS  08-16-23 @ 08:20  Lying BP: --/-- HR: --  Sitting BP: 111/73 HR: 100  Standing BP: --/-- HR: --  Site: --  Mode: --  Orthostatic VS  08-15-23 @ 08:13  Lying BP: --/-- HR: --  Sitting BP: 109/80 HR: 82  Standing BP: --/-- HR: --  Site: --  Mode: --  Orthostatic VS  08-14-23 @ 18:38  Lying BP: --/-- HR: --  Sitting BP: 113/64 HR: 84  Standing BP: --/-- HR: --  Site: --  Mode: --  Orthostatic VS  08-14-23 @ 14:30  Lying BP: --/-- HR: --  Sitting BP: 96/60 HR: 84  Standing BP: --/-- HR: --  Site: --  Mode: auscultated w/ stethoscope   Vital Signs Last 24 Hrs  T(C): 36.4 (08-16-23 @ 08:20), Max: 36.4 (08-16-23 @ 08:20)  T(F): 97.5 (08-16-23 @ 08:20), Max: 97.5 (08-16-23 @ 08:20)  HR: --  BP: --  BP(mean): --  RR: --  SpO2: --    Orthostatic VS  08-16-23 @ 08:20  Lying BP: --/-- HR: --  Sitting BP: 111/73 HR: 100  Standing BP: --/-- HR: --  Site: --  Mode: --  Orthostatic VS  08-15-23 @ 08:13  Lying BP: --/-- HR: --  Sitting BP: 109/80 HR: 82  Standing BP: --/-- HR: --  Site: --  Mode: --  Orthostatic VS  08-14-23 @ 18:38  Lying BP: --/-- HR: --  Sitting BP: 113/64 HR: 84  Standing BP: --/-- HR: --  Site: --  Mode: --

## 2023-08-16 NOTE — BH INPATIENT PSYCHIATRY PROGRESS NOTE - PRN MEDS
Routing to Dr. Dyer to please advise on early fill     The last fill quantity was 2/15/2020 for gabapentin 100 mg 180 tabs     She started 300 mg about 2 weeks ago, but did go back and forth from 200 mg to 300 mg.  She is out of medication.      Nay Carolina RN, [facility]     MEDICATIONS  (PRN):  diphenhydrAMINE 50 milliGRAM(s) Oral every 6 hours PRN Rash and/or Itching  diphenhydrAMINE Injectable 50 milliGRAM(s) IntraMuscular once PRN eps ppx  haloperidol     Tablet 5 milliGRAM(s) Oral every 6 hours PRN agitation  haloperidol    Injectable 5 milliGRAM(s) IntraMuscular Once PRN extreme agitation  hydrOXYzine hydrochloride 50 milliGRAM(s) Oral every 6 hours PRN anxiety  LORazepam   Injectable 2 milliGRAM(s) IntraMuscular Once PRN extreme agitation  nicotine  Polacrilex Gum 2 milliGRAM(s) Oral every 3 hours PRN nicotine dependence

## 2023-08-16 NOTE — BH INPATIENT PSYCHIATRY PROGRESS NOTE - NSBHASSESSSUMMFT_PSY_ALL_CORE
Pt is a a 28 single woman who lives with her mother and brother (though recently living in her car for several weeks), employed as  (though has not attended work for past 3 wks), w/ PPHx of borderline personality disorder, anxiety, depression, and anorexia nervosa, no prior psych hospitalizations, hx of several prior SAs, hx of NSSIB by cutting and burning, hx of treatment in PHP ([facility] x2, [facility] x1), previously completed [facility] DBT program, hx of EtOH abuse (in remission) and current cannabis use 4-5x/week, BIB mother for worsening anxiety, mood lability, and paranoia in the context of recent prescription of Adderall, poor adherence to medication, and conflict with partner.    Pt presents with anxiety, dysregulated mood, improved PO intake, improved sleep, and remains with some paranoia regarding surveillance and hacking of her devices (though pt. describes reality testing these beliefs). She is often vague and overinclusive, requiring some redirection though less than yesterday 8/14. These sxs are most likely explained by chronic anxiety and borderline personality disorder. Pt. is motivated for treatment and participating in group and individual therapy.     Plan:  -Psychiatric: Titrate Zoloft to 50mg PO QD for anxiety  	-Atarax 50 mg PO PRN & Benadryl 50 mg PO PRN for anxiety  	-Ativan 2 mg PRN, Haldol 5 mg PRN, Benadryl 50 mg PRN for agitation  -Medical: Pt. w/ rash (possibly cellulitis), Pt. w/ hx of anorexia, recently hospitalized for dehydration  	-restart clindamycin 300 mg PO Q6 x 5 days  	-Monitor vitals  	-f/u nutrition consult  -I/G/M: as appropriate  -Collateral: pt. refused collateral from family. Will call back psych NP Astrid Johnson tomorrow am   -Dispo: pt. considering [facility], referral was placed.       Pt is a a 28 single woman who lives with her mother and brother (though recently living in her car for several weeks), employed as  (though has not attended work for past 3 wks), w/ PPHx of borderline personality disorder, anxiety, depression, and anorexia nervosa, no prior psych hospitalizations, hx of several prior SAs, hx of NSSIB by cutting and burning, hx of treatment in PHP ([facility] x2, [facility] x1), previously completed [facility] DBT program, hx of EtOH abuse (in remission) and current cannabis use 4-5x/week, BIB mother for worsening anxiety, mood lability, and paranoia in the context of recent prescription of Adderall, poor adherence to medication, and conflict with partner.    Pt presents with anxiety, dysregulated mood, improved PO intake, and remains with some paranoia regarding surveillance and hacking of her devices (though pt. describes reality testing these beliefs). She is often vague and overinclusive, requiring some redirection though less than upon initial assessment. These sxs are most likely explained by chronic anxiety and borderline personality disorder. Pt. is motivated for treatment and participating in group and individual therapy. Dispo planning in progress, referral to [facility] is in place.    Plan:  -Psychiatric: c/w Zoloft to 50mg PO QD for anxiety  	-Atarax 50 mg PO PRN & Benadryl 50 mg PO PRN for anxiety  	-Ativan 2 mg PRN, Haldol 5 mg PRN, Benadryl 50 mg PRN for agitation  -Medical: Pt. w/ urinary frequency, rash (possibly cellulitis), Pt. w/ hx of anorexia, recently hospitalized for dehydration  	-f/u UA/UC   	-monitor rash for spread/erythema  	-Monitor vitals  	-f/u nutrition consult --> pt. noted to be underweight (BMI 18.5) w/ calorie intake < 50% of energy needs.  -I/G/M: as appropriate  -Collateral: pt. refused collateral from family. Will call back psych NP Astrid Johnson tomorrow am   -Dispo: pt. considering PHP, referral was placed.       Pt is a a 28 single woman who lives with her mother and brother (though recently living in her car for several weeks), employed as  (though has not attended work for past 3 wks), w/ PPHx of borderline personality disorder, anxiety, depression, and anorexia nervosa, no prior psych hospitalizations, hx of several prior SAs, hx of NSSIB by cutting and burning, hx of treatment in PHP ([facility] x2, [facility] x1), previously completed [facility] DBT program, hx of EtOH abuse (in remission) and current cannabis use 4-5x/week, BIB mother for worsening anxiety, mood lability, and paranoia in the context of recent prescription of Adderall, poor adherence to medication, and conflict with partner.    Pt presents with anxiety, dysregulated mood, improved PO intake, and remains with some paranoia regarding surveillance and hacking of her devices (though pt. describes reality testing these beliefs). She is often vague and overinclusive, requiring some redirection though less than upon initial assessment. These sxs are most likely explained by chronic anxiety and borderline personality disorder. Pt. is motivated for treatment and participating in group and individual therapy. Dispo planning in progress, referral to [facility] is in place.    Plan:  -Psychiatric: c/w Zoloft to 50mg PO QD for anxiety  	-Atarax 50 mg PO PRN & Benadryl 50 mg PO PRN for anxiety  	-Ativan 2 mg PRN, Haldol 5 mg PRN, Benadryl 50 mg PRN for agitation  -Medical: Pt. w/ urinary frequency, rash (possibly cellulitis), Pt. w/ hx of anorexia, recently hospitalized for dehydration  	-UA w/ large + leukocyte esterase  	-f/u UC --> r/s abx as appropriate  	-monitor rash for spread/erythema  	-Monitor vitals  	-f/u nutrition consult --> pt. noted to be underweight (BMI 18.5) w/ calorie intake < 50% of energy needs.  -I/G/M: as appropriate  -Collateral: pt. refused collateral from family. Will call back psych NP Astrid Johnson tomorrow am   -Dispo: pt. considering [facility], referral was placed.       Pt is a a 28 single woman who lives with her mother and brother (though recently living in her car for several weeks), employed as  (though has not attended work for past 3 wks), w/ PPHx of borderline personality disorder, anxiety, depression, and anorexia nervosa, no prior psych hospitalizations, hx of several prior SAs, hx of NSSIB by cutting and burning, hx of treatment in PHP ([facility] x2, [facility] x1), previously completed [facility] DBT program, hx of EtOH abuse (in remission) and current cannabis use 4-5x/week, BIB mother for worsening anxiety, mood lability, and paranoia in the context of recent prescription of Adderall, poor adherence to medication, and conflict with partner.    Pt presents with anxiety, dysregulated mood, improved PO intake, and remains with some paranoia regarding surveillance and hacking of her devices (though pt. describes reality testing these beliefs). She is often vague and overinclusive, requiring some redirection though less than upon initial assessment. These sxs are most likely explained by chronic anxiety and borderline personality disorder. Pt. is motivated for treatment and participating in group and individual therapy. Dispo planning in progress, referral to [facility] is in place.    Plan:  -Psychiatric: c/w Zoloft to 50mg PO QD for anxiety  	-Atarax 50 mg PO PRN & Benadryl 50 mg PO PRN for anxiety  	-Ativan 2 mg PRN, Haldol 5 mg PRN, Benadryl 50 mg PRN for agitation  -Medical: Pt. w/ urinary frequency, rash (possibly cellulitis), Pt. w/ hx of anorexia, recently hospitalized for dehydration  	-UA w/ large + leukocyte esterase, start course of macrobid 100mg BID x5days  	-f/u UC --> r/s abx as appropriate  	-monitor rash for spread/erythema  	-Monitor vitals  	-f/u nutrition consult --> pt. noted to be underweight (BMI 18.5) w/ calorie intake < 50% of energy needs.  -I/G/M: as appropriate  -Collateral: pt. refused collateral from family. Will call back psych NP Astrid Johnson tomorrow am   -Dispo: pt. considering [facility], referral was placed.

## 2023-08-16 NOTE — BH INPATIENT PSYCHIATRY PROGRESS NOTE - NSBHMETABOLIC_PSY_ALL_CORE_FT
BMI: BMI (kg/m2): 18.6 (08-12-23 @ 19:05)  HbA1c: A1C with Estimated Average Glucose Result: 4.6 % (08-13-23 @ 09:50)    Glucose:   BP: --  Lipid Panel: Date/Time: 08-13-23 @ 09:50  Cholesterol, Serum: 188  Direct LDL: --  HDL Cholesterol, Serum: 60  Total Cholesterol/HDL Ration Measurement: --  Triglycerides, Serum: 74

## 2023-08-16 NOTE — BH INPATIENT PSYCHIATRY PROGRESS NOTE - MSE UNSTRUCTURED FT
The patient appears stated age, fair hygiene and dressed appropriately w/ blanket wrapped around her.  The patient was anxious, cooperative with the interview and maintained appropriate eye contact. Psychomotor agitation noted to be improved.  Steady gait observed.  The patient's speech was fluent, normal in tone, rate, and volume.  The patient's mood is "anxious". Affect is anxious, tearful at times, less irritable, labile. TP is more linear today though still overinclusive. TC: +persecutory and referential delusions involving hacking of devices and surveillance though delusions are much less intense/less delusional conviction, preoccupied with discharge, discusses long-standing anxiety and mood dysregulation, denies SIIP and HIIP. Perception: denies AH and VH, does not appear to be responding to internal stimuli.  Insight is limited.  Judgment is poor.  Impulse control has been fair on the unit, though a bit tenuous. The patient appears stated age, fair hygiene and dressed appropriately.  The patient was anxious, cooperative with the interview and maintained appropriate eye contact. No psychomotor abnormalities noted.  Steady gait observed.  The patient's speech was fluent, normal in tone, rate, and volume.  The patient's mood is "anxious". Affect is anxious, tearful at times, less labile/more stable. TP is linear though still overinclusive at times. TC: +persecutory and referential delusions involving hacking of devices and surveillance though delusions are much less intense/less delusional conviction, preoccupied with discharge, denies SIIP and HIIP. Perception: denies AH and VH, does not appear to be responding to internal stimuli.  Insight is limited.  Judgment is fair.  Impulse control has been fair on the unit.

## 2023-08-16 NOTE — BH INPATIENT PSYCHIATRY PROGRESS NOTE - NSBHFUPINTERVALHXFT_PSY_A_CORE
Chart reviewed, case d/w interdisciplinary team, no acute events overnight. Adherent with standing medication. PRN Atarax given around midnight. Pt. reports feeling extremely nervous and anxious, but displayed improved emotion regulation during the interview. Pt continues to be very discharge-focused, mentioning that she feels she could do better in "more intensive therapy", though did not elaborate what that meant, and discusses plans to seek out ACT therapy and a spirtual healer. She is amenable to PHP. Denies SIIP or urges to self-harm. Denies HIIP. Reports ongoing belief of hacking/survelliance though now with much less intensity. Pt. revealed rash on thigh for which she was prescribed antibiotics, to be restarted. Chart reviewed, case d/w interdisciplinary team, no acute events overnight. Adherent with standing medication. PRN Atarax given around midnight. Pt. slept 5 hours and reports that her roommate asked her to stay up and talk to her. Pt. reports feeling extremely nervous and anxious, stating that being here is making her increasingly anxious. Pt continues to be very discharge-focused. She is amenable to PHP. Team discussed pt. preference for discharge home to her aunt, though hasn't committed to this dispo plan, stating "there might be a better plan if I think it through". Denies SIIP or urges to self-harm. Denies HIIP. Reports ongoing belief of hacking/survelliance though now with much less intensity. She also reports frequent urination though denies other sxs of UTI.

## 2023-08-16 NOTE — BH INPATIENT PSYCHIATRY PROGRESS NOTE - CURRENT MEDICATION
MEDICATIONS  (STANDING):  potassium chloride    Tablet ER 40 milliEquivalent(s) Oral once  sertraline 50 milliGRAM(s) Oral daily    MEDICATIONS  (PRN):  diphenhydrAMINE 50 milliGRAM(s) Oral every 6 hours PRN Rash and/or Itching  diphenhydrAMINE Injectable 50 milliGRAM(s) IntraMuscular once PRN eps ppx  haloperidol     Tablet 5 milliGRAM(s) Oral every 6 hours PRN agitation  haloperidol    Injectable 5 milliGRAM(s) IntraMuscular Once PRN extreme agitation  hydrOXYzine hydrochloride 50 milliGRAM(s) Oral every 6 hours PRN anxiety  LORazepam   Injectable 2 milliGRAM(s) IntraMuscular Once PRN extreme agitation  nicotine  Polacrilex Gum 2 milliGRAM(s) Oral every 3 hours PRN nicotine dependence

## 2023-08-17 LAB
CULTURE RESULTS: SIGNIFICANT CHANGE UP
SPECIMEN SOURCE: SIGNIFICANT CHANGE UP

## 2023-08-17 PROCEDURE — 90853 GROUP PSYCHOTHERAPY: CPT

## 2023-08-17 RX ORDER — SERTRALINE 25 MG/1
1 TABLET, FILM COATED ORAL
Qty: 30 | Refills: 0
Start: 2023-08-17 | End: 2023-09-15

## 2023-08-17 RX ORDER — NICOTINE POLACRILEX 2 MG
1 GUM BUCCAL
Qty: 1 | Refills: 0
Start: 2023-08-17 | End: 2023-09-15

## 2023-08-17 RX ADMIN — Medication 100 MILLIGRAM(S): at 08:28

## 2023-08-17 RX ADMIN — Medication 50 MILLIGRAM(S): at 23:02

## 2023-08-17 RX ADMIN — SERTRALINE 50 MILLIGRAM(S): 25 TABLET, FILM COATED ORAL at 08:29

## 2023-08-17 RX ADMIN — Medication 100 MILLIGRAM(S): at 20:51

## 2023-08-17 NOTE — BH INPATIENT PSYCHIATRY PROGRESS NOTE - NSBHFUPINTERVALHXFT_PSY_A_CORE
Chart reviewed, case d/w interdisciplinary team, no acute events overnight. Adherent with standing medication. PRN Atarax given around 10:45 pm. Pt. slept 6.5 hours per sleep log. Pt. reports feeling extremely nervous and anxious, stating that being here is making her increasingly anxious. Pt continues to be very discharge-focused. She is amenable to PHP. Team discussed housing options after discharge, encouraging her to stay with a family member, though pt. expressed desire to stay in an air bnb in case the feeling of being watched returns, stating she would "feel that way at my mom's or aunt's house". Denies SIIP or urges to self-harm. Denies HIIP. Reports ongoing belief of hacking/survelliance though now with much less intensity. Team introduced the idea of starting Risperdal to target residual paranoia. Pt. wishes to think about it and potentially start the medication in outpatient.  Chart reviewed, case d/w interdisciplinary team, no acute events overnight. Adherent with standing medication. PRN Atarax given around 10:45 pm. Pt. slept 6.5 hours per sleep log. Pt. reports feeling nervous and anxious, relates this to wish for discharge.  She is amenable to PHP. Team discussed housing options after discharge, encouraging her to stay with a family member, though pt. expressed desire to stay in an air bnb in case the feeling of being watched returns, stating she would "feel that way at my mom's or aunt's house". Denies SIIP or urges to self-harm. Denies HIIP. Reports ongoing belief of hacking/survelliance though now with much less intensity, challenging these beliefs. Team introduced the idea of starting Risperdal to target residual paranoia. Pt. wishes to think about it and potentially start the medication in outpatient.     Spoke with pt's brother who was updated on treatment and discharge plan.

## 2023-08-17 NOTE — BH TREATMENT PLAN - NSTXEATINGINTERRN_PSY_ALL_CORE
Establish therapeutic relationship with the patient.  Reinforce the importance of adequate nutrition in maintaining good physical and mental health.  Provide patient with appropriate dietary guidelines and encourage continued adherence to adequate nutrition plan.  Provide pleasant relaxing environment for eating.  Monitor patients intake.

## 2023-08-17 NOTE — BH TREATMENT PLAN - ANXIETY/PANIC/FEAR NURSING INTERVENTIONS/RECOMMENDATIONS
Assess mental status, mood, thoughts, behavior.  Ensure safety on unit.  Encourage pt to verbalize needs/feelings to staff appropriately.  Assess for (+) S/I/I/P.  Provide reassurance, support.  Provide teaching on medication regimen, indication, schedule does, treatment plan, importance of compliance.  Provide teaching on effective coping skills, stress management, relaxation techniques.  Administer medications as prescribed.

## 2023-08-17 NOTE — BH INPATIENT PSYCHIATRY PROGRESS NOTE - CURRENT MEDICATION
MEDICATIONS  (STANDING):  nitrofurantoin monohydrate/macrocrystals (MACROBID) 100 milliGRAM(s) Oral two times a day  sertraline 50 milliGRAM(s) Oral daily    MEDICATIONS  (PRN):  diphenhydrAMINE 50 milliGRAM(s) Oral every 6 hours PRN Rash and/or Itching  diphenhydrAMINE Injectable 50 milliGRAM(s) IntraMuscular once PRN eps ppx  haloperidol     Tablet 5 milliGRAM(s) Oral every 6 hours PRN agitation  haloperidol    Injectable 5 milliGRAM(s) IntraMuscular Once PRN extreme agitation  hydrOXYzine hydrochloride 50 milliGRAM(s) Oral every 6 hours PRN anxiety  LORazepam   Injectable 2 milliGRAM(s) IntraMuscular Once PRN extreme agitation  nicotine  Polacrilex Gum 2 milliGRAM(s) Oral every 3 hours PRN nicotine dependence

## 2023-08-17 NOTE — BH PSYCHOLOGY - GROUP THERAPY NOTE - NSBHPSYCHOLPARTICIPCOMMENT_PSY_A_CORE FT
Patient actively participated in group. Patient attempted to relate to topics through personal experiences. Patient volunteered to read from worksheet without being asked. Patient answered questions and contributed in the group discussion actively.  
Patient appeared attentive and interested in the group discussion.  Although the patient did not contribute spontaneously during the group session, patient was able to read from the worksheet when prompted. Patient was able to engage with the  and other members appropriately.
Patient appeared attentive and interested in the group discussion.  Although the patient did not contribute spontaneously or volunteer to read during the group session, patient was able to follow along with the group discussion. She was observed to have some difficulties sitting still, preferring to walk around the periphery of the dayroom during the session.

## 2023-08-17 NOTE — BH INPATIENT PSYCHIATRY PROGRESS NOTE - NSBHTIMEACTIVITIESPERFORMED_PSY_A_CORE
pt interview, chart review, team meeting, documentation 
pt interview, team meeting, documentation 
pt interview, phone call with brother, team meeting, documentation

## 2023-08-17 NOTE — BH INPATIENT PSYCHIATRY PROGRESS NOTE - NSTXANXGOAL_PSY_ALL_CORE
Report a reduction in panic attacks and improving mood and confidence

## 2023-08-17 NOTE — BH PSYCHOLOGY - GROUP THERAPY NOTE - NSBHPSYCHOLRESPONSE_PSY_A_CORE
Symptoms reduced/Accepted support
Coping skills acquired/Insight displayed/Accepted support
Symptoms reduced/Coping skills acquired/Accepted support

## 2023-08-17 NOTE — BH INPATIENT PSYCHIATRY PROGRESS NOTE - NSBHMETABOLIC_PSY_ALL_CORE_FT
BMI: BMI (kg/m2): 18.6 (08-12-23 @ 19:05)  HbA1c: A1C with Estimated Average Glucose Result: 4.6 % (08-13-23 @ 09:50)    Glucose:   BP: 115/64 (08-17-23 @ 08:22) (115/64 - 115/64)  Lipid Panel: Date/Time: 08-13-23 @ 09:50  Cholesterol, Serum: 188  Direct LDL: --  HDL Cholesterol, Serum: 60  Total Cholesterol/HDL Ration Measurement: --  Triglycerides, Serum: 74

## 2023-08-17 NOTE — BH INPATIENT PSYCHIATRY PROGRESS NOTE - MSE UNSTRUCTURED FT
The patient appears stated age, fair hygiene and dressed appropriately.  The patient was anxious, cooperative with the interview and maintained appropriate eye contact. No psychomotor abnormalities noted.  Steady gait observed.  The patient's speech was fluent, normal in tone, rate, and volume.  The patient's mood is "anxious". Affect is anxious, tearful at times, less labile/more stable. TP is linear though still overinclusive at times. TC: +persecutory and referential delusions involving hacking of devices and surveillance though delusions are much less intense/less delusional conviction, preoccupied with discharge, denies SIIP and HIIP. Perception: denies AH and VH, does not appear to be responding to internal stimuli.  Insight is limited.  Judgment is fair.  Impulse control has been fair on the unit. The patient appears stated age, fair hygiene and dressed appropriately.  The patient was anxious, cooperative with the interview and maintained appropriate eye contact. No psychomotor abnormalities noted.  Steady gait observed.  The patient's speech was fluent, normal in tone, rate, and volume.  The patient's mood is "anxious". Affect is anxious, less labile/more stable. TP is linear though still overinclusive at times. TC: +persecutory and referential delusions involving hacking of devices and surveillance though delusions are much less intense/less delusional conviction, preoccupied with discharge, denies SIIP and HIIP. Perception: denies AH and VH, does not appear to be responding to internal stimuli.  Insight is limited.  Judgment is fair.  Impulse control has been fair on the unit.

## 2023-08-17 NOTE — BH INPATIENT PSYCHIATRY PROGRESS NOTE - NSBHFUPINTERVALCCFT_PSY_A_CORE
f/u anxiety and panic attacks

## 2023-08-17 NOTE — BH TREATMENT PLAN - NSTXCOPEINTERPR_PSY_ALL_CORE
Over the next 7 days, psychiatric rehabilitation staff will continue to support the pt towards goal progress and encourage group participation for skill development and socialization.

## 2023-08-17 NOTE — BH INPATIENT PSYCHIATRY PROGRESS NOTE - NSDCCRITERIA_PSY_ALL_CORE
Symptom Stabilization  CGI<=3  Outpatient services f/u  

## 2023-08-17 NOTE — BH INPATIENT PSYCHIATRY PROGRESS NOTE - NSBHASSESSSUMMFT_PSY_ALL_CORE
Pt is a a 28 single woman who lives with her mother and brother (though recently living in her car for several weeks), employed as  (though has not attended work for past 3 wks), w/ PPHx of borderline personality disorder, anxiety, depression, and anorexia nervosa, no prior psych hospitalizations, hx of several prior SAs, hx of NSSIB by cutting and burning, hx of treatment in PHP ([facility] x2, [facility] x1), previously completed [facility] DBT program, hx of EtOH abuse (in remission) and current cannabis use 4-5x/week, BIB mother for worsening anxiety, mood lability, and paranoia in the context of recent prescription of Adderall, poor adherence to medication, and conflict with partner.    Pt presents with anxiety, dysregulated mood, improved PO intake, and remains with some paranoia regarding surveillance and hacking of her devices (though pt. describes reality testing these beliefs). She is somewhat vague and overinclusive, requiring some redirection though is much more linear and focused than on initial presentation. These sxs are most likely explained by chronic anxiety and borderline personality disorder. Pt. is motivated for treatment and participating in group and individual therapy. Dispo planning in progress, referral to [facility] is in place.    Plan:  -Psychiatric: c/w Zoloft to 50mg PO QD for anxiety  	-Atarax 50 mg PO PRN & Benadryl 50 mg PO PRN for anxiety  	-Ativan 2 mg PRN, Haldol 5 mg PRN, Benadryl 50 mg PRN for agitation  -Medical: Pt. w/ urinary frequency, rash (possibly cellulitis), Pt. w/ hx of anorexia, recently hospitalized for dehydration  	-UA w/ large + leukocyte esterase, c/w course of macrobid 100mg BID x5days  	-f/u UC --> r/s abx as appropriate  	-monitor rash for spread/erythema  	-Monitor vitals  	-f/u nutrition consult --> pt. noted to be underweight (BMI 18.5) w/ calorie intake < 50% of energy needs.  -I/G/M: as appropriate  -Collateral: Spoke to pt. brother regarding dispo plan, collateral obtained from NP Astrid Johnson on 8/16 (see SW note)   -Dispo: pt. considering [facility], referral was placed.       Pt is a a 28 single woman who lives with her mother and brother (though recently living in her car for several weeks), employed as  (though has not attended work for past 3 wks), w/ PPHx of borderline personality disorder, anxiety, depression, and anorexia nervosa, no prior psych hospitalizations, hx of several prior SAs, hx of NSSIB by cutting and burning, hx of treatment in PHP ([facility] x2, [facility] x1), previously completed [facility] DBT program, hx of EtOH abuse (in remission) and current cannabis use 4-5x/week, BIB mother for worsening anxiety, mood lability, and paranoia in the context of recent prescription of Adderall, poor adherence to medication, and conflict with partner.    Pt presents with anxiety, dysregulated mood, improved PO intake, and remains with some paranoia regarding surveillance and hacking of her devices (though pt. describes reality testing these beliefs). She is somewhat vague and overinclusive, requiring some redirection though is much more linear and focused than on initial presentation. These sxs are most likely explained by chronic anxiety and borderline personality disorder. Pt. is motivated for treatment and participating in group and individual therapy. Dispo planning in progress, referral to [facility] is in place.    Plan:  -Psychiatric: c/w Zoloft to 50mg PO QD for anxiety  	-Atarax 50 mg PO PRN & Benadryl 50 mg PO PRN for anxiety  	-Ativan 2 mg PRN, Haldol 5 mg PRN, Benadryl 50 mg PRN for agitation  -Medical: Pt. w/ urinary frequency, w/ hx of anorexia (recently hospitalized for dehydration)  	-UA w/ + leukocyte esterase, UCx negative, will d/c Macrobid   	-Monitor vitals  	-f/u nutrition consult --> pt. noted to be underweight (BMI 18.5) w/ calorie intake < 50% of energy needs.  -I/G/M: as appropriate  -Collateral: Spoke to pt. brother regarding dispo plan, collateral obtained from NP Astrid Johnson on 8/16 (see SW note)   -Dispo: referral to PHP.

## 2023-08-17 NOTE — BH TREATMENT PLAN - NSTXDCOTHRINTERSW_PSY_ALL_CORE
SW will provide support to patient as she utilizes skills and supports while inpatient to reduce symptomology.

## 2023-08-17 NOTE — BH PSYCHOLOGY - GROUP THERAPY NOTE - NSPSYCHOLGRPGENPT_PSY_A_CORE FT
Patient attended the cognitive behavior therapy group session. Group focused on the topic of automatic thoughts.  This included how to identify automatic thoughts and learning how to change the thoughts that have a negative impact on daily functioning. Group expectations and guidelines, as well as confidentiality and its limitations, were addressed. Principles of cognitive behavior therapy related to today's group topic were reviewed and discussed. Group members illustrated understanding of these concepts by giving personal examples based on their current experiences. Discussions stemmed from the group topics to the causal connection between thoughts, feelings, and behaviors.
Patient attended the cognitive behavior therapy group session. Group session focused on the topic of changing patterns of limited thinking.  Discussion revolved around the eight different patterns as well as identifying the group's recognition of their own patterns of limited thinking.  Group expectations and guidelines, as well as confidentiality and its limitations, were addressed. Principles of cognitive behavior therapy related to today's group topic were reviewed and discussed.  Group members illustrated understanding of these concepts by giving personal examples based on their current experiences. Discussions stemmed from the group topics to the causal connection between thoughts, feelings, and behaviors.
Patient attended the psychology cognitive-behavior therapy group. The discussion was focused on the topic of Core Beliefs. Group expectations, guidelines, confidentiality (as well as its limitations) were reviewed. The group began with a description of core beliefs as well as examples. Group members then identified some of their core beliefs and common themes in thoughts. The role that core beliefs have on oneself was also explained. Discussion stemmed from the group’s connection between thoughts, feelings, and behaviors. Group members demonstrated their understanding through active participation, discussion, and by asking clarifying questions. Members were also provided with a handout describing the concepts and strategies discussed during group.

## 2023-08-17 NOTE — BH INPATIENT PSYCHIATRY PROGRESS NOTE - NSBHCHARTREVIEWVS_PSY_A_CORE FT
Vital Signs Last 24 Hrs  T(C): 36.6 (08-17-23 @ 08:22), Max: 36.6 (08-17-23 @ 08:22)  T(F): 97.9 (08-17-23 @ 08:22), Max: 97.9 (08-17-23 @ 08:22)  HR: --  BP: 115/64 (08-17-23 @ 08:22) (115/64 - 115/64)  BP(mean): 53 (08-17-23 @ 08:22) (53 - 53)  RR: --  SpO2: --    Orthostatic VS  08-16-23 @ 08:20  Lying BP: --/-- HR: --  Sitting BP: 111/73 HR: 100  Standing BP: --/-- HR: --  Site: --  Mode: --

## 2023-08-17 NOTE — BH INPATIENT PSYCHIATRY PROGRESS NOTE - NSTXEATINGGOAL_PSY_ALL_CORE
Maintain acceptable weight for age and body type

## 2023-08-17 NOTE — BH PSYCHOLOGY - GROUP THERAPY NOTE - NSPSYCHOLGRPGENGOAL_PSY_A_CORE
assessment/decrease symptoms/improve level of independent functioning/improve social/vocational/coping skills/psychoeducation/treatment compliance
assessment/decrease symptoms/improve level of independent functioning/improve social/vocational/coping skills/psychoeducation/treatment compliance
assessment/improve level of independent functioning/improve social/vocational/coping skills/prevent relapse/psychoeducation/treatment compliance

## 2023-08-17 NOTE — BH TREATMENT PLAN - NSTXCAREGIVERPARTICIPATE_PSY_P_CORE
No, patient unwilling to involve family/caregiver Family/Caregiver participated in identification of needs/problems/goals for treatment/Family/Caregiver participated in development of after care plan

## 2023-08-17 NOTE — BH INPATIENT PSYCHIATRY PROGRESS NOTE - NSBHATTESTBILLING_PSY_A_CORE
71026-Nmtbyetslp OBS or IP - moderate complexity OR 35-49 mins
39232-Dewqxiqkdu OBS or IP - high complexity OR 50-79 mins
10130-Gupfxbaadr OBS or IP - high complexity OR 50-79 mins

## 2023-08-17 NOTE — BH TREATMENT PLAN - NSTXCOPEINTERRN_PSY_ALL_CORE
Assess and document readiness and ability to learn, learning needs and preferences.  Assess, monitor and document precipitating factors to increasing anxiety, hostility and inappropriate defensive coping skills.  Identify risks to safety, implement protocols as warranted.  Educate/demonstrate use and development of appropriate coping strategies (relaxation, guided imagery, optimism, music therapy, exercise, fresh air).  Encourage group attendance/participating as patient progresses.

## 2023-08-17 NOTE — BH INPATIENT PSYCHIATRY PROGRESS NOTE - NSBHCONSBHPROVDETAILS_PSY_A_CORE  FT
Attempted to contact, was instructed to call back tomorrow am (8/15)

## 2023-08-17 NOTE — BH INPATIENT PSYCHIATRY PROGRESS NOTE - NSBHATTESTCOMMENTATTENDFT_PSY_A_CORE
Pt remains with intense anxiety though today mood is more stable and paranoia/delusions less intense (pt better able to engage in reality-testing). She remains discharge-focused. Engaged in groups and therapy on the unit. Still does not wish for family involvement. Open to PHP referral. Will c/w Zoloft and titrate to 50mg daily. 
Pt presents with severe anxiety, dysregulated mood, poor PO intake, poor sleep, and intense paranoia regarding surveillance and hacking of her devices (resulting in pt living out of her car for several weeks recently). She is often vague and quite tangential, requiring frequent redirection. DDx at the moment is wide and includes BPD, EMMA, psychosis NOS, substance-induced psychosis (re: Adderall and marijuana). Offered pt Seroquel to target mood, anxiety, poor sleep, and paranoia though she declined. Prefers to restart Zoloft instead (to target mood and anxiety). Will encourage engagement in individual and group therapy. Pt refusing involvement of family at this time. Will consider referral to PHP. 
Pt remains anxious though overall mood is more stable. She remains with some residual paranoia with regards to hacking of her devices/surveillance, though she is able to challenge these thoughts and such beliefs appear to be much less intense. Pt remains very discharge-focused. Will c/w Zoloft. Pt offered Risperdal to target paranoia though she declines at this time. Referral to PHP. 
Pt remains anxious though mood is more stable. Denies SIIP or urges to self harm. Remains with some paranoia regarding surveillance/hacking though such beliefs are much less intense. Pt remains very engaged on the unit. Will c/w Zoloft. Start Macrobid for UTI symptoms and +leuk esterase on UA, awaiting urine culture results. Referral made to PHP.

## 2023-08-17 NOTE — BH INPATIENT PSYCHIATRY PROGRESS NOTE - NSTXDCOTHRGOAL_PSY_ALL_CORE
Patient's symptoms will reduce over the course of the next several days.

## 2023-08-18 VITALS — TEMPERATURE: 99 F

## 2023-08-18 PROCEDURE — 99239 HOSP IP/OBS DSCHRG MGMT >30: CPT

## 2023-08-18 RX ORDER — HYDROXYZINE HCL 10 MG
1 TABLET ORAL
Qty: 30 | Refills: 0
Start: 2023-08-18 | End: 2023-09-16

## 2023-08-18 RX ADMIN — Medication 100 MILLIGRAM(S): at 09:07

## 2023-08-18 RX ADMIN — SERTRALINE 50 MILLIGRAM(S): 25 TABLET, FILM COATED ORAL at 09:07

## 2023-08-18 NOTE — BH INPATIENT PSYCHIATRY DISCHARGE NOTE - NSBHASSESSSUMMFT_PSY_ALL_CORE
Pt is a a 28 single woman who lives with her mother and brother (though recently living in her car for several weeks), employed as  (though has not attended work for past 3 wks), w/ PPHx of borderline personality disorder, anxiety, depression, and anorexia nervosa, no prior psych hospitalizations, hx of several prior SAs, hx of NSSIB by cutting and burning, hx of treatment in PHP ([facility] x2, [facility] x1), previously completed [facility] DBT program, hx of EtOH abuse (in remission) and current cannabis use 4-5x/week, BIB mother for worsening anxiety, mood lability, and paranoia in the context of recent prescription of Adderall, poor adherence to medication, and conflict with partner.    Pt presents with anxiety, dysregulated mood, improved PO intake, and remains with some paranoia regarding surveillance and hacking of her devices (though pt. describes reality testing these beliefs). These sxs are most likely explained by chronic anxiety and borderline personality disorder. Pt. continues to engage with the treatment team and participate meaningfully in group and individual therapy. Pt. will be discharged to home with her mom and brother today 8/18 and begin PHP on Tuesday 8/22    Plan:  -Psychiatric: c/w Zoloft to 50mg PO QD for anxiety  	-Atarax 50 mg PO PRN & Benadryl 50 mg PO PRN for anxiety  	-Ativan 2 mg PRN, Haldol 5 mg PRN, Benadryl 50 mg PRN for agitation  -Medical: Pt. w/ hx of anorexia (recently hospitalized for dehydration)  	-Monitor vitals  	-f/u nutrition consult --> pt. noted to be underweight (BMI 18.5) w/ calorie intake < 50% of energy needs.  -I/G/M: as appropriate  -Collateral: Spoke to pt. brother regarding dispo plan, collateral obtained from NP Astrid Johnson on 8/16 (see SW note)   -Dispo: accepted to PHP w/ start date 8/22       Pt is a a 28 single woman who lives with her mother and brother (though recently living in her car for several weeks), employed as  (though has not attended work for past 3 wks), w/ PPHx of borderline personality disorder, anxiety, depression, and anorexia nervosa, no prior psych hospitalizations, hx of several prior SAs, hx of NSSIB by cutting and burning, hx of treatment in PHP ([facility] x2, [facility] x1), previously completed [facility] DBT program, hx of EtOH abuse (in remission) and current cannabis use 4-5x/week, BIB mother for worsening anxiety, mood lability, and paranoia in the context of recent prescription of Adderall, poor adherence to medication, and conflict with partner.    Pt remains with some anxiety (chronic in nature) and ongoing paranoia about being hacked/surveilled; however mood is more stable, anxiety is no longer overwhelming, and paranoia is significantly decreased in intensity (pt able to challenge prior beliefs, has much less delusional conviction). She is in good behavioral control, tending to ADLs appropriately, sleeping well, with adequate PO intake, has consistently denied SIIP or urges to self-harm, and she is future-oriented and hopeful. Given significant clinical improvement pt no longer presents as an acute risk of harm to self (or others) and no longer requires hospitalization. She will be discharged home today on Zoloft 50mg daily (w/ prn Atarax available as well). Pt will start PHP next week.

## 2023-08-18 NOTE — BH INPATIENT PSYCHIATRY DISCHARGE NOTE - NSBHMETABOLIC_PSY_ALL_CORE_FT
BMI: BMI (kg/m2): 18.6 (08-12-23 @ 19:05)  HbA1c: A1C with Estimated Average Glucose Result: 4.6 % (08-13-23 @ 09:50)    Glucose:   BP: 113/68 (08-17-23 @ 20:15) (113/68 - 115/64)  Lipid Panel: Date/Time: 08-13-23 @ 09:50  Cholesterol, Serum: 188  Direct LDL: --  HDL Cholesterol, Serum: 60  Total Cholesterol/HDL Ration Measurement: --  Triglycerides, Serum: 74

## 2023-08-18 NOTE — BH INPATIENT PSYCHIATRY DISCHARGE NOTE - NSBHDCHANDOFFFT_PSY_ALL_CORE
Provider handoff emailed to PHP on 8/18 secure email sent to providers at [facility] (Dr. Lubin and Aj Tamayo) with HPI, hospital course, and current meds/recommendations.

## 2023-08-18 NOTE — BH INPATIENT PSYCHIATRY DISCHARGE NOTE - HPI (INCLUDE ILLNESS QUALITY, SEVERITY, DURATION, TIMING, CONTEXT, MODIFYING FACTORS, ASSOCIATED SIGNS AND SYMPTOMS)
Chart review and Patient was seen and evaluated, chart, medications and labs reviewed. Case discussed with nursing team.  On service for this 28 year old single female, no dependents.  Patient domiciles in private residence with family, currently unemployed.  Patient has PPH borderline personality disorder, depression, and anxiety. Patient has no history of  inpatient hospitalizations. Patient is hospitalized with a primary problem of paranoia, anxiety and worsening depression.   Patient admitted to [facility] on a 9.39 legal status. I have reviewed the initial psychiatric assessment in the electronic medical record, including the history of present illness, past psychiatric history, family/social history (no pertinent changes), and exam, and have confirmed the salient findings dated 23    As per chart review, transferring records indicated the followinF, living in private residence w/ family, single, no dependents, unemployed, past psychiatric history of borderline personality disorder, depression, and anxiety, no past psychiatric hospitalizations, history of multiple SAs (last a year and a half ago by unknown means), remote history of NSSIB by cutting, near-daily cannabis use, no other known substance, no history of legal issues/aggression/violence, PMH of anorexia nervosa, who was BIB for paranoia, anxiety, and depression.  Chart reviewed. Patient presents as vague, tangential, and thought blocked at times. Patient reports that she has been "struggling for a while." Patient describes worsening anxiety, which she describes as frequent worry, nervousness, restlessness, nausea, and panic attacks. She says she has been "ruminating /," but does not elaborate on her ruminations. She says over the last few weeks she has been increasingly scared due a feeling that people are watching her. She does not identify people who may be watching her. When asked if specific people are monitoring her, she says, "no... well ...yes," but does not discuss further. Denies feeling as if she is being monitored by the police, FBI, [employer], or other municipal/governmental agencies. Patient is quite vague and states repeatedly that she endured some sort of trauma recently but does not describe in detail. Patient says that as a result of said trauma, she "shut down," which she describes as feeling like she is not herself, cannot do things she used to do, accompanied by frequent sobbing and difficulty expressing her thoughts. Patient reports feeling at times like her face and body no longer belong to her. . Patient reports feeling depressed, w/ limited appetite, poor sleep (3-4 hours a night), and feelings of worthlessness, but denies hopelessness, anhedonia, and amotivation. Patient describes chronic passive thoughts of death but denies active S/I/I/P. Patient says she had spent weeks living out of car, due to issues w/ mother and brother, but recently returned to mother's home. Patient states she was brought in today by mother at recommendation of her aunt, but does not know why aunt was concerned. Describes her aunt as "controlling and toxic." Patient says she had planned to go to her best friend's wedding today, but felt too overwhelmed and returned home. She comments repeatedly that she wants to get better so she doesn't lose the "love of my life," however, is vague when discussing this relationship. Patient endorses paranoia and referential thinking (thinking certain things in the environment are somehow related to her), but denies auditory hallucinations, thought insertion, thought withdrawal, and thought broadcasting. Patient denies manic symptoms, such as grandiosity, decreased need for sleep, persistently elevated and/or irritable mood, and increase in goal-directed behavior. Endorses daily cannabis use but denies other substance use. Patient reports struggling w/ ADLs, including feeding herself (of note, patient w/ AN) and tending to personal hygiene. No H/I/I/P or V/I/I/P.   Collateral information obtained from patient's mother, Rashmi Mathis ([phone]). Per mother, patient left home in  due to paranoia that she was being monitored in the house. The patient then left out of her car until returning 7 weeks ago. Since coming home, patient has been hiding in closets and under beds out of fear she is being targeted. Daughter has also been despondent, w/ frequent unexplained sobbing. Mother says that since patient has been home, her PO intake has been very limited. Mother has seen her eat "maybe a few handfuls of Goldfish." Patient was diagnosed last week w/ cellulitis, however, has not been adherent w/ antibiotics. Mother says patient was supposed to go to best friend's wedding today, but returned home twice. Mother suspects this is related to issues patient is having w/ new significant other. Reports patient has been prescribed Zoloft and Adderall by an online provider; she does not believe the patient is adherent. Mother is concerned for her daughter's safety and is advocating for admission.    On unit:  Patient is seen for borderline personality disorder, depression, and anxiety.  Patient is discussed with nursing team, no interval events.  Patient has been in fair behavioral control, no prns for aggression.  Patient is observed in interview room she is calm, cooperative with interview.    Discussed precipitants to warrant services, pt reports “I was not being myself my anxiety is worsening.”  Patient reports “my family member took what I said and ran with it”  When asked what she told family member, patient went on a tangent not really answering the question. Patient reports worsening mood but denies SI.    Patient also describes persistent anxiety. During interview patient presents disorganized tangential at times, mumbling and not making any sense. Told writer that she is here because of “crippling anxiety and inability to function” Patient denies any specific trigger.     Denies any current or past suicidal thoughts, or negative thoughts to self-harm. No thoughts to harm others.  Reveals past SIB via cutting and burning.    Client denies any hx or current AVH, delusions, psychotic disorganization, mind reading abilities, thought insertion, ideas of reference, special chávez, or thought broadcasting.  Does mention feelings of  paranoia, reports she felt she was being followed.  Pt states “I got nervous, but I was challenging these thoughts”     Denies history of aggression or violence. No access to firearm. Patient presents with symptoms suggestive of active shannan: (some grandiosity, racing thoughts/ increase in risk taking behavior (drug use daily cannabis), presents with  pressured speech, sleep disruption), no signs of catatonia (with no signs of mutism, negativism, stereotypy, echolalia, echopraxia, posturing or rigidity. He was alert and able to answer appropriately to questions during exam).. She denies obsessive, intrusive and persistent thoughts or compulsive, ritualistic acts are reported. Patient denies any active legal issues, is not currently under any kind of court supervision.   In regards to substance use, patient reports daily use of cannabis, vaping, admitting utox +THC. Reports history of eating disorder, no purging, but recent binging, BMI 18.6.  PMH: Anemia, bradycardia    Patient reports past medication trial: Celexa, cymbalta, Ativan, klonopin, atarax, abilify, wellbutrin, seroquel, trazodone, lamictal, neurontin, remeron     Chart review and Patient was seen and evaluated, chart, medications and labs reviewed. Case discussed with nursing team.  On service for this 28 year old single female, no dependents.  Patient domiciles in private residence with family, currently unemployed.  Patient has PPH borderline personality disorder, depression, and anxiety. Patient has no history of  inpatient hospitalizations. Patient is hospitalized with a primary problem of paranoia, anxiety and worsening depression.   Patient admitted to [facility] on a 9.39 legal status. I have reviewed the initial psychiatric assessment in the electronic medical record, including the history of present illness, past psychiatric history, family/social history (no pertinent changes), and exam, and have confirmed the salient findings dated 23    As per chart review, transferring records indicated the followinF, living in private residence w/ family, single, no dependents, unemployed, past psychiatric history of borderline personality disorder, depression, and anxiety, no past psychiatric hospitalizations, history of multiple SAs (last a year and a half ago by unknown means), remote history of NSSIB by cutting, near-daily cannabis use, no other known substance, no history of legal issues/aggression/violence, PMH of anorexia nervosa, who was BIB for paranoia, anxiety, and depression.  Chart reviewed. Patient presents as vague, tangential, and thought blocked at times. Patient reports that she has been "struggling for a while." Patient describes worsening anxiety, which she describes as frequent worry, nervousness, restlessness, nausea, and panic attacks. She says she has been "ruminating /," but does not elaborate on her ruminations. She says over the last few weeks she has been increasingly scared due a feeling that people are watching her. She does not identify people who may be watching her. When asked if specific people are monitoring her, she says, "no... well ...yes," but does not discuss further. Denies feeling as if she is being monitored by the police, FBI, [employer], or other municipal/governmental agencies. Patient is quite vague and states repeatedly that she endured some sort of trauma recently but does not describe in detail. Patient says that as a result of said trauma, she "shut down," which she describes as feeling like she is not herself, cannot do things she used to do, accompanied by frequent sobbing and difficulty expressing her thoughts. Patient reports feeling at times like her face and body no longer belong to her. Patient reports feeling depressed, w/ limited appetite, poor sleep (3-4 hours a night), and feelings of worthlessness, but denies hopelessness, anhedonia, and amotivation. Patient describes chronic passive thoughts of death but denies active S/I/I/P. Patient says she had spent weeks living out of car, due to issues w/ mother and brother, but recently returned to mother's home. Patient states she was brought in today by mother at recommendation of her aunt, but does not know why aunt was concerned. Describes her aunt as "controlling and toxic." Patient says she had planned to go to her best friend's wedding today, but felt too overwhelmed and returned home. She comments repeatedly that she wants to get better so she doesn't lose the "love of my life," however, is vague when discussing this relationship. Patient endorses paranoia and referential thinking (thinking certain things in the environment are somehow related to her), but denies auditory hallucinations, thought insertion, thought withdrawal, and thought broadcasting. Patient denies manic symptoms, such as grandiosity, decreased need for sleep, persistently elevated and/or irritable mood, and increase in goal-directed behavior. Endorses daily cannabis use but denies other substance use. Patient reports struggling w/ ADLs, including feeding herself (of note, patient w/ AN) and tending to personal hygiene. No H/I/I/P or V/I/I/P.   Collateral information obtained from patient's mother, Rashmi Mathis ([phone]). Per mother, patient left home in  due to paranoia that she was being monitored in the house. The patient then lived out of her car until returning 7 weeks ago. Since coming home, patient has been hiding in closets and under beds out of fear she is being targeted. Daughter has also been despondent, w/ frequent unexplained sobbing. Mother says that since patient has been home, her PO intake has been very limited. Mother has seen her eat "maybe a few handfuls of Goldfish." Patient was diagnosed last week w/ cellulitis, however, has not been adherent w/ antibiotics. Mother says patient was supposed to go to best friend's wedding today, but returned home twice. Mother suspects this is related to issues patient is having w/ new significant other. Reports patient has been prescribed Zoloft and Adderall by an online provider; she does not believe the patient is adherent. Mother is concerned for her daughter's safety and is advocating for admission.    On unit:  Patient is seen for borderline personality disorder, depression, and anxiety.  Patient is discussed with nursing team, no interval events.  Patient has been in fair behavioral control, no prns for aggression.  Patient is observed in interview room she is calm, cooperative with interview.    Discussed precipitants to warrant services, pt reports “I was not being myself my anxiety is worsening.”  Patient reports “my family member took what I said and ran with it”  When asked what she told family member, patient went on a tangent not really answering the question. Patient reports worsening mood but denies SI. Patient also describes persistent anxiety. During interview patient presents disorganized tangential at times, mumbling and not making any sense. Told writer that she is here because of “crippling anxiety and inability to function” Patient denies any specific trigger.     Denies any current or past suicidal thoughts, or negative thoughts to self-harm. No thoughts to harm others.  Reveals past SIB via cutting and burning.    Client denies any hx or current AVH, delusions, psychotic disorganization, mind reading abilities, thought insertion, ideas of reference, special chávez, or thought broadcasting.  Does mention feelings of  paranoia, reports she felt she was being followed.  Pt states “I got nervous, but I was challenging these thoughts”     Denies history of aggression or violence. No access to firearm. Patient presents with symptoms suggestive of active shannan: (some grandiosity, racing thoughts/ increase in risk taking behavior (drug use daily cannabis), presents with  pressured speech, sleep disruption), no signs of catatonia (with no signs of mutism, negativism, stereotypy, echolalia, echopraxia, posturing or rigidity. He was alert and able to answer appropriately to questions during exam).. She denies obsessive, intrusive and persistent thoughts or compulsive, ritualistic acts are reported. Patient denies any active legal issues, is not currently under any kind of court supervision.   In regards to substance use, patient reports daily use of cannabis, vaping, admitting utox +THC. Reports history of eating disorder, no purging, but recent binging, BMI 18.6.  PMH: Anemia, bradycardia    Patient reports past medication trial: Celexa, cymbalta, Ativan, klonopin, atarax, abilify, wellbutrin, seroquel, trazodone, lamictal, neurontin, remeron     Chart review and Patient was seen and evaluated, chart, medications and labs reviewed. Case discussed with nursing team.  On service for this 28 year old single female, no dependents.  Patient domiciles in private residence with family, currently unemployed.  Patient has PPH borderline personality disorder, depression, and anxiety. Patient has no history of  inpatient hospitalizations. Patient is hospitalized with a primary problem of paranoia, anxiety and worsening depression.   Patient admitted to [facility] on a 9.39 legal status. I have reviewed the initial psychiatric assessment in the electronic medical record, including the history of present illness, past psychiatric history, family/social history (no pertinent changes), and exam, and have confirmed the salient findings dated 23.      As per chart review, transferring records indicated the followinF, living in private residence w/ family, single, no dependents, unemployed, past psychiatric history of borderline personality disorder, depression, and anxiety, no past psychiatric hospitalizations, history of multiple SAs (last a year and a half ago by unknown means), remote history of NSSIB by cutting, near-daily cannabis use, no other known substance, no history of legal issues/aggression/violence, PMH of anorexia nervosa, who was BIB for paranoia, anxiety, and depression.  Chart reviewed. Patient presents as vague, tangential, and thought blocked at times. Patient reports that she has been "struggling for a while." Patient describes worsening anxiety, which she describes as frequent worry, nervousness, restlessness, nausea, and panic attacks. She says she has been "ruminating /," but does not elaborate on her ruminations. She says over the last few weeks she has been increasingly scared due a feeling that people are watching her. She does not identify people who may be watching her. When asked if specific people are monitoring her, she says, "no... well ...yes," but does not discuss further. Denies feeling as if she is being monitored by the police, FBI, [employer], or other municipal/governmental agencies. Patient is quite vague and states repeatedly that she endured some sort of trauma recently but does not describe in detail. Patient says that as a result of said trauma, she "shut down," which she describes as feeling like she is not herself, cannot do things she used to do, accompanied by frequent sobbing and difficulty expressing her thoughts. Patient reports feeling at times like her face and body no longer belong to her. Patient reports feeling depressed, w/ limited appetite, poor sleep (3-4 hours a night), and feelings of worthlessness, but denies hopelessness, anhedonia, and amotivation. Patient describes chronic passive thoughts of death but denies active S/I/I/P. Patient says she had spent weeks living out of car, due to issues w/ mother and brother, but recently returned to mother's home. Patient states she was brought in today by mother at recommendation of her aunt, but does not know why aunt was concerned. Describes her aunt as "controlling and toxic." Patient says she had planned to go to her best friend's wedding today, but felt too overwhelmed and returned home. She comments repeatedly that she wants to get better so she doesn't lose the "love of my life," however, is vague when discussing this relationship. Patient endorses paranoia and referential thinking (thinking certain things in the environment are somehow related to her), but denies auditory hallucinations, thought insertion, thought withdrawal, and thought broadcasting. Patient denies manic symptoms, such as grandiosity, decreased need for sleep, persistently elevated and/or irritable mood, and increase in goal-directed behavior. Endorses daily cannabis use but denies other substance use. Patient reports struggling w/ ADLs, including feeding herself (of note, patient w/ AN) and tending to personal hygiene. No H/I/I/P or V/I/I/P.   Collateral information obtained from patient's mother, Rashmi Mathis ([phone]). Per mother, patient left home in  due to paranoia that she was being monitored in the house. The patient then lived out of her car until returning 7 weeks ago. Since coming home, patient has been hiding in closets and under beds out of fear she is being targeted. Daughter has also been despondent, w/ frequent unexplained sobbing. Mother says that since patient has been home, her PO intake has been very limited. Mother has seen her eat "maybe a few handfuls of Goldfish." Patient was diagnosed last week w/ cellulitis, however, has not been adherent w/ antibiotics. Mother says patient was supposed to go to best friend's wedding today, but returned home twice. Mother suspects this is related to issues patient is having w/ new significant other. Reports patient has been prescribed Zoloft and Adderall by an online provider; she does not believe the patient is adherent. Mother is concerned for her daughter's safety and is advocating for admission.    On unit:  Patient is seen for borderline personality disorder, depression, and anxiety.  Patient is discussed with nursing team, no interval events.  Patient has been in fair behavioral control, no prns for aggression.  Patient is observed in interview room she is calm, cooperative with interview.    Discussed precipitants to warrant services, pt reports “I was not being myself my anxiety is worsening.”  Patient reports “my family member took what I said and ran with it”  When asked what she told family member, patient went on a tangent not really answering the question. Patient reports worsening mood but denies SI. Patient also describes persistent anxiety. During interview patient presents disorganized tangential at times, mumbling and not making any sense. Told writer that she is here because of “crippling anxiety and inability to function” Patient denies any specific trigger.     Denies any current or past suicidal thoughts, or negative thoughts to self-harm. No thoughts to harm others.  Reveals past SIB via cutting and burning.    Client denies any hx or current AVH, delusions, psychotic disorganization, mind reading abilities, thought insertion, ideas of reference, special chávez, or thought broadcasting.  Does mention feelings of  paranoia, reports she felt she was being followed.  Pt states “I got nervous, but I was challenging these thoughts”     Denies history of aggression or violence. No access to firearm. Patient presents with symptoms suggestive of active shannan: (some grandiosity, racing thoughts/ increase in risk taking behavior (drug use daily cannabis), presents with  pressured speech, sleep disruption), no signs of catatonia (with no signs of mutism, negativism, stereotypy, echolalia, echopraxia, posturing or rigidity. He was alert and able to answer appropriately to questions during exam).. She denies obsessive, intrusive and persistent thoughts or compulsive, ritualistic acts are reported. Patient denies any active legal issues, is not currently under any kind of court supervision.   In regards to substance use, patient reports daily use of cannabis, vaping, admitting utox +THC. Reports history of eating disorder, no purging, but recent binging, BMI 18.6.  PMH: Anemia, bradycardia    Patient reports past medication trial: Celexa, cymbalta, Ativan, klonopin, atarax, abilify, wellbutrin, seroquel, trazodone, lamictal, neurontin, remeron

## 2023-08-18 NOTE — BH INPATIENT PSYCHIATRY DISCHARGE NOTE - HOSPITAL COURSE
Pt is a a 28 single woman who lives with her mother and brother (though recently living in her car for several weeks), employed as  (though has not attended work for past 3 wks), w/ PPHx of borderline personality disorder, anxiety, depression, and anorexia nervosa, no prior psych hospitalizations, hx of several prior SAs, hx of NSSIB by cutting and burning, hx of treatment in PHP ([facility] x2, [facility] x1), previously completed [facility] DBT program, hx of EtOH abuse (in remission) and current cannabis use 4-5x/week, BIB mother for worsening anxiety, mood lability, and paranoia in the context of recent prescription of Adderall, poor adherence to medication, and conflict with partner.        IN PROGRESS Pt is a a 28 single woman who lives with her mother and brother (though recently living in her car for several weeks), employed as  (though has not attended work for past 3 wks), w/ PPHx of borderline personality disorder, anxiety, depression, and anorexia nervosa, no prior psych hospitalizations, hx of several prior SAs, hx of NSSIB by cutting and burning, hx of treatment in PHP ([facility] x2, [facility] x1), previously completed [facility] DBT program, hx of EtOH abuse (in remission) and current cannabis use 4-5x/week, BIB mother for worsening anxiety, mood lability, and paranoia in the context of recent prescription of Adderall, poor adherence to medication, and conflict with partner.    Upon admission to the hospital, tracey. presented with extreme anxiety, labile and dysregulated moods, poor PO intake, and paranoia about being surveilled in her home that resulted in the pt. living out of her car for several weeks. She also endorsed extreme difficulty with communicating her thoughts and emotions which was evidenced by her vague speech and tangential and overinclusive thought patterns that required repeated redirection. She has experienced mood dysregulation and anxiety chronically though they have acutely worsened in the preceding months in the context of a new Adderall prescription, conflict at home, and anxiety about a new romantic relationship. Prior to hospitalization, pt. had received new script for Zoloft and had only taken a couple of doses. Zoloft was restarted at 25 mg PO QD and titrated to 50 mg PO QD. SSRI was started to target her generalized anxiety symptoms including racing thoughts. She was adherent to medication with no side effects noted. Over the course of hospitalization, pt. mood dysregulation improved with increased ability to utilize coping skills to self regulate. She also reports improvement in her paranoia, still endorsing feeling concerned about being surveilled but with better reality testing ability. She remains with anxiety and paranoia but with overall better functioning, more regulated mood, and improved ability to communicate her thoughts with more linear thought pattern. Pt. consistently denied SIIP/HIIP. Pt. remained in fair behavioral control and did not require any PRNs for aggression or agitation. Pt. was highly motivated for treatment, attending groups and participating in individual therapy. She was visible on the unit and socialized with peers. She attended to ADLs appropriately including personal hygiene and improving her PO intake. She remained on routine observation and did not have any acute safety concerns on the unit. We discussed the risks of the medication w/ the pt. verbalizing understanding. Psychoeducation on the pt. diagnoses of anxiety and BPD sxs was provided to her. Pt. is discharged to home with her brother and mom and accepted to [facility] with start on 8/22. Family is supportive of her treatment and agreeable with the discharge planning. Recommend to continue Zoloft and uptitrate to therapeutic effect. Recommend to start Risperdal at PHP to target residual paranoid sxs.    Risk assessment:    Pt. remains with chronically elevated risk of harm to self due to diagnosis of borderline personality disorder, past hx of SA, and past NSSIB. Acute risk factors are minimal but include recent relationship and family conflict. Protective factors are numerous including that pt. consistently denies SIIP, is future oriented, has a strong support system, is treatment motivated, currently adherent to medications, and has shown resilience and adaptability. Pt. is not chronically or acutely at risk for harm to others as she has no history of violence or aggression, has remained in appropriate behavioral control, and consistently denies HIIP/VI. Thus, pt. is not presently an acute risk of harm to herself or others, has achieved optimal benefits from hospitalization, and is appropriate for less restrictive level of care and discharge home. Pt. agreeable to contact outpt providers, call 911, or go to the nearest ED with any imminent safety concerns for self or others. Pt is a a 28 single woman who lives with her mother and brother (though recently living in her car for several weeks), employed as a  (though has not attended work for past 3 wks), w/ PPHx of borderline personality disorder, anxiety, depression, and anorexia nervosa, no prior psych hospitalizations, hx of several prior SAs, hx of NSSIB by cutting and burning, hx of treatment in PHP ([facility] x2, [facility] x1), previously completed [facility] DBT program, hx of EtOH abuse (in remission) and current cannabis use 4-5x/week, BIB mother for worsening anxiety, mood lability, and paranoia in the context of recent prescription of Adderall, poor adherence to medication, conflict with partner, and chronic conflict with various family members.    Upon admission to the hospital, pt. presented with severe anxiety, labile and dysregulated moods, poor PO intake, and paranoia with regards to surveillance and her devices being hacked (which resulted in the pt. living out of her car for several weeks recently). She also endorsed difficulty with communicating her thoughts and emotions which was evidenced by her vague speech and tangential and overinclusive thought patterns that required repeated redirection. Such mood dysregulation and anxiety were chronic in nature though with acute worsening over th past several months (and coincided with recent Adderall prescription, conflict at home, and anxiety about a new romantic relationship). 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      Pt was restarted on home Zoloft (which she had only been prescribed for one week and had been intermittently non-adherent), which was titrated to 50mg daily. Zoloft was intended to target mood and anxiety. Pt was offered Seroquel or Risperdal (to target mood, anxiety, paranoia/delusions, and poor sleep), however she declined both due to concern for weight gain and wish to not rely on medication (though pt was notably more open to trialing Risperdal and agreed that should paranoia persist upon discharge, she would consider trying it). Pt utilized prn Atarax infrequently for anxiety and insomnia.      Over the course of hospitalization, pt's mood became more stable/less labile and she demonstrated ability to utilize coping skills to self regulate. Anxiety, though still present, also improved and was no longer overwhelming in nature. Pt's though process also became more linear and pt was noted to be better able to express her thoughts and feelings in a more direct manner. Pt's paranoia regarding hacking and surveillance remain, though such beliefs are much less intense and pt has demonstrated ability to challenge such thoughts and demonstrates much less delusional conviction (of note pt was not concerned about being surveilled while in the hospital; she voiced this concern only with regards to residing at home with her family).     Pt. consistently denied SIIP/urges to self-harm and HIIP throughout hospitalization. She was in good behavioral control and never became aggressive or violent. Pt. was highly motivated for treatment, attending all groups and participating in individual therapy. She was visible on the unit and socialized with peers. She attended to ADLs appropriately including personal hygiene and improving her PO intake. Overall she slept well. Pt was resistant to involving her family members in her treatment, though family visited her daily, and pt eventually consented to involvement of her brother (who was provided with updates on treatment plan and discharge plan).     Pt was discharged home to her mother and brother.   Pt will start [facility] on 8/22/23.   Pt was discharged with 30-day supply of Zoloft 50mg daily and Atarax 50mg daily prn anxiety.   We recommend further titration of Zoloft and trail of Risperdal should paranoia persist or worsen upon discharge.     Risk assessment:  Pt. remains with chronically elevated risk of harm to self due to diagnosis of borderline personality disorder, past hx of SA, and past NSSIB. Acute risk factors are minimal but include recent relationship and family conflict. Protective factors are numerous including that pt. consistently denies SIIP, is future oriented, has a strong support system, is treatment motivated, currently adherent to medications, and has shown resilience and adaptability. Pt. is not chronically or acutely at risk for harm to others as she has no history of violence or aggression, has remained in appropriate behavioral control, and consistently denies HIIP/VI. Thus, pt. is not presently an acute risk of harm to herself or others, has achieved optimal benefits from hospitalization, and is appropriate for less restrictive level of care and discharge home. Pt. agreeable to contact outpt providers, call 911, or go to the nearest ED with any imminent safety concerns for self or others. Pt is a a 28 single woman who lives with her mother and brother (though recently living in her car for several weeks), employed as a  (though has not attended work for past 3 wks), w/ PPHx of borderline personality disorder, anxiety, depression, and anorexia nervosa, no prior psych hospitalizations, hx of several prior SAs, hx of NSSIB by cutting and burning, hx of treatment in PHP ([facility] x2, [facility] x1), previously completed [facility] DBT program, currently in outpt treatment with psych NP, hx of EtOH abuse (in remission) and current cannabis use 4-5x/week, BIB mother for worsening anxiety, mood lability, and paranoia in the context of recent prescription of Adderall, poor adherence to medication, conflict with partner, and chronic conflict with various family members.    Upon admission to the hospital, pt. presented with severe anxiety, labile and dysregulated moods, poor PO intake, and paranoia with regards to surveillance and her devices being hacked (which resulted in the pt. living out of her car for several weeks recently). She also endorsed difficulty with communicating her thoughts and emotions which was evidenced by her vague speech and tangential and overinclusive thought patterns that required repeated redirection. Such mood dysregulation and anxiety were chronic in nature though with acute worsening over th past several months (and coincided with recent Adderall prescription, conflict at home, and anxiety about a new romantic relationship). 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      Pt was restarted on home Zoloft (which she had only been prescribed for one week and had been intermittently non-adherent), which was titrated to 50mg daily. Zoloft was intended to target mood and anxiety. Pt was offered Seroquel or Risperdal (to target mood, anxiety, paranoia/delusions, and poor sleep), however she declined both due to concern for weight gain and wish to not rely on medication (though pt was notably more open to trialing Risperdal and agreed that should paranoia persist upon discharge, she would consider trying it). Pt utilized prn Atarax infrequently for anxiety and insomnia. Of note, pt's outpt provider had stopped prescribing Adderall to pt prior to this current hospitalization.     Over the course of hospitalization, pt's mood became more stable/less labile and she demonstrated ability to utilize coping skills to self regulate. Anxiety, though still present, also improved and was no longer overwhelming in nature. Pt's though process also became more linear and pt was noted to be better able to express her thoughts and feelings in a more direct manner. Pt's paranoia regarding hacking and surveillance remain, though such beliefs are much less intense and pt has demonstrated ability to challenge such thoughts and demonstrates much less delusional conviction (of note pt was not concerned about being surveilled while in the hospital; she voiced this concern only with regards to residing at home with her family).     Pt. consistently denied SIIP/urges to self-harm and HIIP throughout hospitalization. She was in good behavioral control and never became aggressive or violent. Pt. was highly motivated for treatment, attending all groups and participating in individual therapy. She was visible on the unit and socialized with peers. She attended to ADLs appropriately including personal hygiene and improving her PO intake. Overall she slept well. Pt was advised to abstain from marijuana use (as well as Adderall use) upon discharge and provided with psychoeducation regarding possibility of worsening paranoia with continued use. Pt was resistant to involving her family members in her treatment, though family visited her daily, and pt eventually consented to involvement of her brother (who was provided with updates on treatment plan and discharge plan).     Pt was discharged home to her mother and brother.   Pt will start [facility] on 8/22/23.   Pt was discharged with 30-day supply of Zoloft 50mg daily and Atarax 50mg daily prn anxiety.   We recommend further titration of Zoloft and trail of Risperdal should paranoia persist or worsen upon discharge.     Risk assessment:  Pt. remains with increased CHRONIC risk of harm to self due to cluster b personality pathology, hx of prior SA and NSSIB, hx of EtOH use and chronic near-daily marijuana use, and chronic conflict with various family members. Acute risk factors include recent worsening of anxiety and mood lability with paranoia affecting her functioning at home, now treated with inpatient hospitalization (and subsequent improvement in symptoms, as above). Other protective factors that mitigate acute risks include pt has consistently denied SIIP or urges to self-harm, she is future oriented and hopeful (intends to spend time with family/friends, start online classes in the Fall, continue treatment in PHP), she has strong social support from family and friends, she is treatment-seeking, she has no hx of prior hospitalizations, and she is able to engage in safety planning. Thus, at present, patient is not an acute risk of harm to self or others, has achieved optimal benefits from hospitalization, and is appropriate for less restrictive level of care and discharge home. Pt agreeable to contact outpt providers, call 911, or go to the nearest ED with any imminent safety concerns for self or others.

## 2023-08-18 NOTE — BH DISCHARGE NOTE NURSING/SOCIAL WORK/PSYCH REHAB - NSDCPRGOAL_PSY_ALL_CORE
Writer met with patient to safety plan for discharge and discuss the patient’s progress throughout the inpatient stay. Patient was receptive to safety planning and readily identified coping skills, triggers, social support, and reasons for living. Upon admission, pt. was observed as anxious and presented as tearful and a little labile. Throughout inpatient stay, pt. was motivated during treatment. Pt. would attend groups and participate appropriately. Pt. would be seen engaging with her peers and being helpful and bright. pt. was observed practicing coping skills such as watching TV and calling friends when necessary. Pt. was engaged with treatment team and staff and reported feeling better and less anxious as the time continued. Pt. exhibited good ADLs, was not a behavioral concern on the unit, and denies SI/HI/TH/AH/VH. Pt. met established psych rehab goals as evidenced by pt. completing her safety plan and utilizing her coping skills on the unit.

## 2023-08-18 NOTE — BH INPATIENT PSYCHIATRY DISCHARGE NOTE - OTHER PAST PSYCHIATRIC HISTORY (INCLUDE DETAILS REGARDING ONSET, COURSE OF ILLNESS, INPATIENT/OUTPATIENT TREATMENT)
Patient is 28F, living in private residence w/ family, single, no dependents, unemployed. Patient has PPH of borderline personality disorder, depression, and anxiety. No past psychiatric hospitalizations but has attended 3 PHPs (2 at [facility] in 2019 and 2022 and 1 at [facility]) and the [id number]. She has a history of many past medication trials Patient has history of multiple SAs (last attempt ~2022 by unknown means), remote history of NSSIB by cutting. No history of legal issues/aggression/violence. PMH of anorexia nervosa. Pt was BIB for paranoia, anxiety, and depression.      Patient has PPH of borderline personality disorder, depression, and anxiety. PMH of anorexia nervosa. No past psychiatric hospitalizations but has attended 3 PHPs (2 at [facility] in 2019 and 2022 and 1 at [facility]) and the [id number]. She recently was treated outpatient by psych NP Astrid Johnson, beginning in 4/23, where she was prescribed Adderall and Zoloft for concern of ADHD and OCD. She has a history of many past medication trials (Celexa, cymbalta, Ativan, klonopin, atarax, abilify, wellbutrin, seroquel, trazodone, lamictal, neurontin, remeron). Patient has history of multiple SAs (last attempt ~2022 by unknown means), remote history of NSSIB by cutting. No history of legal issues/aggression/violence.

## 2023-08-18 NOTE — BH DISCHARGE NOTE NURSING/SOCIAL WORK/PSYCH REHAB - NSDCOTHERTYPOFSERV_GEN_ALL_CORE
[facility] [phone] [place] [id number]  [facility]

## 2023-08-18 NOTE — BH INPATIENT PSYCHIATRY DISCHARGE NOTE - NSDCMRMEDTOKEN_GEN_ALL_CORE_FT
hydrOXYzine hydrochloride 50 mg oral tablet: 1 tab(s) orally once a day as needed for anxiety  Nicorette 2 mg oral transmucosal gum: 1 gum oral transmucosal every 4 hours as needed for  nicotine craving  Zoloft 50 mg oral tablet: 1 tab(s) orally once a day

## 2023-08-18 NOTE — BH INPATIENT PSYCHIATRY DISCHARGE NOTE - NSBHDCRISKMITIGATE_PSY_ALL_CORE
Safety planning/Referral to PHP Safety planning/Referral to PHP/Family/Other social support involvement

## 2023-08-18 NOTE — BH DISCHARGE NOTE NURSING/SOCIAL WORK/PSYCH REHAB - DISCHARGE INSTRUCTIONS AFTERCARE APPOINTMENTS
In order to check the location, date, or time of your aftercare appointment, please refer to your Discharge Instructions Document given to you upon leaving the hospital.  If you have lost the instructions please call [phone]

## 2023-08-18 NOTE — BH INPATIENT PSYCHIATRY DISCHARGE NOTE - NSDCCPCAREPLAN_GEN_ALL_CORE_FT
PRINCIPAL DISCHARGE DIAGNOSIS  Diagnosis: Borderline personality disorder  Assessment and Plan of Treatment:

## 2023-08-18 NOTE — BH INPATIENT PSYCHIATRY DISCHARGE NOTE - NSBHDCMEDICALFT_PSY_A_CORE
Pt. was evaluated for UTI and given one dose of Macrobid. U/A and U/C were negative at which point Macrobid was d/baylee

## 2023-08-18 NOTE — BH INPATIENT PSYCHIATRY DISCHARGE NOTE - NSBHFUPINTERVALHXFT_PSY_A_CORE
Chart reviewed, case d/w interdisciplinary team, no acute events overnight. Adherent with standing medication. PRN Atarax given around 11 pm. Pt. reports fair sleep. Pt. reports feeling nervous and anxious, both chronically and as it relates to her discharge and potentially feeling paranoid upon returning home. Denies SIIP or urges to self-harm. Denies HIIP. Reports ongoing belief of hacking/survelliance though now with much less intensity, challenging these beliefs. Team encouraged her to consider starting Risperdal in PHP if she remains with paranoia. Team confirmed with pt. that she will be staying with family (mom and brother) after discharge and confirmed with pt. brother.   Discharge Progress Note Date and Time: 08-18-23 @ 12:49    Chart reviewed, case d/w interdisciplinary team, no acute events overnight. Adherent with standing medication. PRN Atarax given around 11 pm. Pt. reports fair sleep. Pt. reports feeling nervous and anxious, both chronically and as it relates to her discharge and potentially feeling paranoid upon returning home. Denies SIIP or urges to self-harm. Denies HIIP. Reports ongoing belief of hacking/survelliance though now with much less intensity, challenging these beliefs. Team encouraged her to consider starting Risperdal in PHP if she remains with paranoia. Team confirmed with pt. that she will be staying with family (mom and brother) after discharge and confirmed with pt. brother. Pt says she is looking forward to spending time with her family, exercising (running, yoga), seeing friends, and spending time outdoors when she returns home. She is motivated to further engage in PHP and hopes to find employment or start online classes in the near future.

## 2023-08-18 NOTE — BH INPATIENT PSYCHIATRY DISCHARGE NOTE - MSE UNSTRUCTURED FT
The patient appears stated age, fair hygiene and dressed appropriately.  The patient was anxious, cooperative with the interview and maintained appropriate eye contact. No psychomotor abnormalities noted.  Steady gait observed.  The patient's speech was fluent, normal in tone, rate, and volume.  The patient's mood is "anxious". Affect is anxious, at times tearful, less labile/more stable. TP is linear though still overinclusive at times. TC: +persecutory and referential delusions involving hacking of devices and surveillance though delusions are much less intense/less delusional conviction, denies SIIP and HIIP. Perception: denies AH and VH, does not appear to be responding to internal stimuli.  Insight is limited.  Judgment is fair.  Impulse control has been fair on the unit. The patient appears stated age, fair hygiene and dressed appropriately.  The patient was anxious, cooperative with the interview and maintained appropriate eye contact. No psychomotor abnormalities noted.  Steady gait observed.  The patient's speech was fluent, normal in tone, rate, and volume.  The patient's mood is "anxious". Affect is anxious, at times briefly tearful, less labile/more stable. TP is linear though still overinclusive at times. TC: +persecutory and referential delusions involving hacking of devices and surveillance though delusions are much less intense/less delusional conviction, denies SIIP and HIIP, hopeful about the future, looking forward to discharge. Perception: denies AH and VH, does not appear to be responding to internal stimuli.  Insight is limited.  Judgment is fair.  Impulse control has been fair on the unit.

## 2023-08-18 NOTE — BH DISCHARGE NOTE NURSING/SOCIAL WORK/PSYCH REHAB - PATIENT PORTAL LINK FT
You can access the FollowMyHealth Patient Portal offered by [facility] by registering at the following website: [email or web address]. By joining [employer]’s FollowMyHealth portal, you will also be able to view your health information using other applications (apps) compatible with our system.

## 2023-08-18 NOTE — BH INPATIENT PSYCHIATRY DISCHARGE NOTE - ATTENDING DISCHARGE PHYSICAL EXAMINATION:
The patient appears stated age, fair hygiene and dressed appropriately.  The patient was anxious, cooperative with the interview and maintained appropriate eye contact. No psychomotor abnormalities noted.  Steady gait observed.  The patient's speech was fluent, normal in tone, rate, and volume.  The patient's mood is "anxious". Affect is anxious, at times briefly tearful, less labile/more stable. TP is linear though still overinclusive at times. TC: +persecutory and referential delusions involving hacking of devices and surveillance though delusions are much less intense/less delusional conviction, denies SIIP and HIIP, hopeful about the future, looking forward to discharge. Perception: denies AH and VH, does not appear to be responding to internal stimuli.  Insight is limited.  Judgment is fair.  Impulse control has been fair on the unit.

## 2023-08-18 NOTE — BH DISCHARGE NOTE NURSING/SOCIAL WORK/PSYCH REHAB - NSCDUDCCRISIS_PSY_A_CORE
[facility]  1 (002) [facility]-WELL ([phone])  Text "WELL" to 48557  Website: www.[employer]/.[facility] 1 (927) 621-HOPE (3731) Website: www.safehorizon.org/.National Suicide Prevention Lifeline 8 (030) 726-7232/.  [facility]  1 (896) LIFENET (974-9330)/.  [facility]  (589) 730-9371/.  [facility]al Health Helpline / [facility]  [phone] 227-TALK (8928)/.  [facility]  [address] [place] [address] [phone]   Hours:  Monday through Friday from 9 AM to 3 PM/988 Suicide and Crisis Lifeline [address]247

## 2023-08-18 NOTE — BH INPATIENT PSYCHIATRY DISCHARGE NOTE - NSICDXPASTMEDICALHX_GEN_ALL_CORE_FT
PAST MEDICAL HISTORY:  Anxiety     Depression     Seasonal allergies Takes Singulair PRN     PAST MEDICAL HISTORY:  Anorexia nervosa     Anxiety     Depression     Seasonal allergies Takes Singulair PRN

## 2023-08-23 NOTE — SOCIAL WORK POST DISCHARGE FOLLOW UP NOTE - NSBHSWFOLLOWUP_PSY_ALL_CORE_FT
SW called patient at [phone]. SW explored with patient. Patient states she went to intake and said no to the program because she was not open to it. Patient struggled to express reason for refusing program and needed constant redirection to explain why she left the intake. She was then able to state that the questions that they were asking made her feel like they were intrusive questions and it made her feel uncomfortable. She states she felt misunderstood during the intake. When SW mentioned patient trying the [facility] program, patient mentioned that she had already done it before. SW gave her additional options for outpatient care. Patient stated she felt overwhelmed by options and asked if SW could call her back tomorrow so she [pt] could think about all the options for her treatment. ARACELIS will follow up tomorrow.

## 2023-08-25 NOTE — SOCIAL WORK POST DISCHARGE FOLLOW UP NOTE - NSBHSWFOLLOWUP_PSY_ALL_CORE_FT
SW called patient at [phone]. Patient would like assistance in returning to her [facility] provider, while looking for a trauma-focused therapist that does EMDR or an ACT therapist. SW to follow up.

## 2023-09-01 NOTE — SOCIAL WORK POST DISCHARGE FOLLOW UP NOTE - NSBHSWFOLLOWUP_PSY_ALL_CORE_FT
SW called patient at [phone]. SW discussed issues with finding a therapist that will take her - many feel she needs a higher level of care. SW proposed a [facility] program to patient. Patient ambivalent and wanted to discuss program more but needed to terminate call due to being at job. Patient states she will call back shortly - unit number provided.

## 2023-09-01 NOTE — SOCIAL WORK POST DISCHARGE FOLLOW UP NOTE - NSBHSWFOLLOWUP_PSY_ALL_CORE_FT
ARACELIS called patient back. She wants to return to her original [facility] NP Astrid Johnson. SW to establish appointment.

## 2023-09-01 NOTE — SOCIAL WORK POST DISCHARGE FOLLOW UP NOTE - NSBHSWFOLLOWUP_PSY_ALL_CORE_FT
ARACELIS called [facility] [phone] to schedule appointment. 9/11 at 11:15am in person. Fax for D/C summary: [phone]

## 2023-09-12 NOTE — SOCIAL WORK POST DISCHARGE FOLLOW UP NOTE - NSBHSWFOLLOWUP_PSY_ALL_CORE_FT
Patient called ARACELIS and left voicemail saying she missed her appointment scheduled by ARACELIS, but that she re-scheduled a new appointment for herself. Case Closed.

## 2023-11-04 NOTE — ED ADULT TRIAGE NOTE - PAIN: PRESENCE, MLM
Patient seen and examined at bedside.    Overnight Events: No acute events overnight. Denies chest pain or SOB      REVIEW OF SYSTEMS:  Constitutional:     [ x] negative [ ] fevers [ ] chills [ ] weight loss [ ] weight gain  HEENT:                  [ x] negative [ ] dry eyes [ ] eye irritation [ ] postnasal drip [ ] nasal congestion  CV:                         [x ] negative  [ ] chest pain [ ] orthopnea [ ] palpitations [ ] murmur  Resp:                     [ x] negative [ ] cough [ ] shortness of breath [ ] dyspnea [ ] wheezing [ ] sputum [ ]hemoptysis  GI:                          [ x] negative [ ] nausea [ ] vomiting [ ] diarrhea [ ] constipation [ ] abd pain [ ] dysphagia   :                        [ x] negative [ ] dysuria [ ] nocturia [ ] hematuria [ ] increased urinary frequency  Musculoskeletal: [ x] negative [ ] back pain [ ] myalgias [ ] arthralgias [ ] fracture  Skin:                       [ x] negative [ ] rash [ ] itch  Neurological:        [ x] negative [ ] headache [ ] dizziness [ ] syncope [ ] weakness [ ] numbness  Psychiatric:           [ x] negative [ ] anxiety [ ] depression  Endocrine:            [ x] negative [ ] diabetes [ ] thyroid problem  Heme/Lymph:      [ x] negative [ ] anemia [ ] bleeding problem  Allergic/Immune: [x ] negative [ ] itchy eyes [ ] nasal discharge [ ] hives [ ] angioedema    [x ] All other systems negative  [ ] Unable to assess ROS due to    Current Meds:  acetaminophen     Tablet .. 650 milliGRAM(s) Oral every 6 hours  aluminum hydroxide/magnesium hydroxide/simethicone Suspension 30 milliLiter(s) Oral every 4 hours PRN  aMIOdarone    Tablet 200 milliGRAM(s) Oral daily  aspirin  chewable 81 milliGRAM(s) Oral daily  atorvastatin 40 milliGRAM(s) Oral at bedtime  chlorhexidine 2% Cloths 1 Application(s) Topical daily  furosemide   Injectable 40 milliGRAM(s) IV Push daily  influenza  Vaccine (HIGH DOSE) 0.7 milliLiter(s) IntraMuscular once  losartan 25 milliGRAM(s) Oral daily  melatonin 5 milliGRAM(s) Oral at bedtime  metoprolol tartrate 12.5 milliGRAM(s) Oral two times a day  nystatin Powder 1 Application(s) Topical three times a day  ondansetron Injectable 4 milliGRAM(s) IV Push every 8 hours PRN  quiNIDine gluconate  milliGRAM(s) Oral every 12 hours  sodium chloride 3%  Inhalation 4 milliLiter(s) Inhalation every 8 hours      PAST MEDICAL & SURGICAL HISTORY:  Obese      Smoker      HTN (hypertension)      HLD (hyperlipidemia)      CAD (coronary artery disease)      CHF with cardiomyopathy      History of hip surgery          Vitals:  T(F): 98.1 (11-03), Max: 98.1 (11-03)  HR: 98 (11-04) (68 - 98)  BP: 102/68 (11-04) (102/68 - 137/71)  RR: 18 (11-04)  SpO2: 92% (11-04)  I&O's Summary    02 Nov 2023 07:01  -  03 Nov 2023 07:00  --------------------------------------------------------  IN: 840 mL / OUT: 850 mL / NET: -10 mL    03 Nov 2023 07:01  -  04 Nov 2023 05:45  --------------------------------------------------------  IN: 780 mL / OUT: 1000 mL / NET: -220 mL        Physical Exam:  Appearance: No acute distress; well appearing  Eyes: PERRL, EOMI, pink conjunctiva  HENT: Normal oral mucosa  Cardiovascular: RRR, S1, S2, no murmurs, rubs, or gallops; no edema; no JVD  Respiratory: Clear to auscultation bilaterally  Gastrointestinal: soft, non-tender, non-distended with normal bowel sounds  Musculoskeletal: No clubbing; no joint deformity   Neurologic: Non-focal  Lymphatic: No lymphadenopathy  Psychiatry: AAOx3, mood & affect appropriate  Skin: No rashes, ecchymoses, or cyanosis                          10.9   17.29 )-----------( 443      ( 03 Nov 2023 06:38 )             34.7     11-03    142  |  106  |  14  ----------------------------<  117<H>  4.0   |  27  |  0.50    Ca    8.6      03 Nov 2023 06:39  Mg     2.0     11-02      PT/INR - ( 03 Nov 2023 13:01 )   PT: 12.0 sec;   INR: 1.09 ratio         PTT - ( 02 Nov 2023 09:48 )  PTT:27.1 sec                 Patient seen and examined at bedside.    Overnight Events: No acute events overnight. Denies chest pain or SOB      REVIEW OF SYSTEMS:  Constitutional:     [ x] negative [ ] fevers [ ] chills [ ] weight loss [ ] weight gain  HEENT:                  [ x] negative [ ] dry eyes [ ] eye irritation [ ] postnasal drip [ ] nasal congestion  CV:                         [x ] negative  [ ] chest pain [ ] orthopnea [ ] palpitations [ ] murmur  Resp:                     [ x] negative [ ] cough [ ] shortness of breath [ ] dyspnea [ ] wheezing [ ] sputum [ ]hemoptysis  GI:                          [ x] negative [ ] nausea [ ] vomiting [ ] diarrhea [ ] constipation [ ] abd pain [ ] dysphagia   :                        [ x] negative [ ] dysuria [ ] nocturia [ ] hematuria [ ] increased urinary frequency  Musculoskeletal: [ x] negative [ ] back pain [ ] myalgias [ ] arthralgias [ ] fracture  Skin:                       [ x] negative [ ] rash [ ] itch  Neurological:        [ x] negative [ ] headache [ ] dizziness [ ] syncope [ ] weakness [ ] numbness  Psychiatric:           [ x] negative [ ] anxiety [ ] depression  Endocrine:            [ x] negative [ ] diabetes [ ] thyroid problem  Heme/Lymph:      [ x] negative [ ] anemia [ ] bleeding problem  Allergic/Immune: [x ] negative [ ] itchy eyes [ ] nasal discharge [ ] hives [ ] angioedema    [x ] All other systems negative  [ ] Unable to assess ROS due to    Current Meds:  acetaminophen     Tablet .. 650 milliGRAM(s) Oral every 6 hours  aluminum hydroxide/magnesium hydroxide/simethicone Suspension 30 milliLiter(s) Oral every 4 hours PRN  aMIOdarone    Tablet 200 milliGRAM(s) Oral daily  aspirin  chewable 81 milliGRAM(s) Oral daily  atorvastatin 40 milliGRAM(s) Oral at bedtime  chlorhexidine 2% Cloths 1 Application(s) Topical daily  furosemide   Injectable 40 milliGRAM(s) IV Push daily  influenza  Vaccine (HIGH DOSE) 0.7 milliLiter(s) IntraMuscular once  losartan 25 milliGRAM(s) Oral daily  melatonin 5 milliGRAM(s) Oral at bedtime  metoprolol tartrate 12.5 milliGRAM(s) Oral two times a day  nystatin Powder 1 Application(s) Topical three times a day  ondansetron Injectable 4 milliGRAM(s) IV Push every 8 hours PRN  quiNIDine gluconate  milliGRAM(s) Oral every 12 hours  sodium chloride 3%  Inhalation 4 milliLiter(s) Inhalation every 8 hours      PAST MEDICAL & SURGICAL HISTORY:  Obese      Smoker      HTN (hypertension)      HLD (hyperlipidemia)      CAD (coronary artery disease)      CHF with cardiomyopathy      History of hip surgery          Vitals:  T(F): 98.1 (11-03), Max: 98.1 (11-03)  HR: 98 (11-04) (68 - 98)  BP: 102/68 (11-04) (102/68 - 137/71)  RR: 18 (11-04)  SpO2: 92% (11-04)  I&O's Summary    02 Nov 2023 07:01  -  03 Nov 2023 07:00  --------------------------------------------------------  IN: 840 mL / OUT: 850 mL / NET: -10 mL    03 Nov 2023 07:01  -  04 Nov 2023 05:45  --------------------------------------------------------  IN: 780 mL / OUT: 1000 mL / NET: -220 mL        Physical Exam:  Appearance: No acute distress; well appearing  Eyes: PERRL, EOMI, pink conjunctiva  HENT: Normal oral mucosa  Cardiovascular: RRR, S1, S2, no murmurs, rubs, or gallops; no edema; no JVD  Respiratory: Clear to auscultation bilaterally  Gastrointestinal: soft, non-tender, non-distended with normal bowel sounds  Musculoskeletal: No clubbing; no joint deformity   Neurologic: Non-focal  Lymphatic: No lymphadenopathy  Psychiatry: AAOx3, mood & affect appropriate  Skin: No rashes, ecchymoses, or cyanosis                          10.9   17.29 )-----------( 443      ( 03 Nov 2023 06:38 )             34.7     11-03    142  |  106  |  14  ----------------------------<  117<H>  4.0   |  27  |  0.50    Ca    8.6      03 Nov 2023 06:39  Mg     2.0     11-02      PT/INR - ( 03 Nov 2023 13:01 )   PT: 12.0 sec;   INR: 1.09 ratio         PTT - ( 02 Nov 2023 09:48 )  PTT:27.1 sec           TTE 10/31   1. Moderate pericardial effusion noted adjacent to the posterior left ventricle with no evidence of hemodynamic compromise.   2. Bilateral pleural effusion noted.   3. Compared to the transthoracic echocardiogram performed on 10/30/2023 there have been no significant interval changes.      TTE 11/2    1. Compared to the transthoracic echocardiogram performed on 10/31/2023 there have been no significant interval changes.   2. Trace pericardial effusion noted adjacent to the anterior right ventricle with no evidence of hemodynamic compromise.   Patient seen and examined at bedside.    Overnight Events: No acute events overnight. Denies chest pain or SOB      REVIEW OF SYSTEMS:  Constitutional:     [ x] negative [ ] fevers [ ] chills [ ] weight loss [ ] weight gain  HEENT:                  [ x] negative [ ] dry eyes [ ] eye irritation [ ] postnasal drip [ ] nasal congestion  CV:                         [x ] negative  [ ] chest pain [ ] orthopnea [ ] palpitations [ ] murmur  Resp:                     [ x] negative [ ] cough [ ] shortness of breath [ ] dyspnea [ ] wheezing [ ] sputum [ ]hemoptysis  GI:                          [ x] negative [ ] nausea [ ] vomiting [ ] diarrhea [ ] constipation [ ] abd pain [ ] dysphagia   :                        [ x] negative [ ] dysuria [ ] nocturia [ ] hematuria [ ] increased urinary frequency  Musculoskeletal: [ x] negative [ ] back pain [ ] myalgias [ ] arthralgias [ ] fracture  Skin:                       [ x] negative [ ] rash [ ] itch  Neurological:        [ x] negative [ ] headache [ ] dizziness [ ] syncope [ ] weakness [ ] numbness  Psychiatric:           [ x] negative [ ] anxiety [ ] depression  Endocrine:            [ x] negative [ ] diabetes [ ] thyroid problem  Heme/Lymph:      [ x] negative [ ] anemia [ ] bleeding problem  Allergic/Immune: [x ] negative [ ] itchy eyes [ ] nasal discharge [ ] hives [ ] angioedema    [x ] All other systems negative  [ ] Unable to assess ROS due to    MEDICATIONS  (STANDING):  acetaminophen     Tablet .. 650 milliGRAM(s) Oral every 6 hours  aMIOdarone    Tablet 200 milliGRAM(s) Oral daily  aspirin  chewable 81 milliGRAM(s) Oral daily  atorvastatin 40 milliGRAM(s) Oral at bedtime  chlorhexidine 2% Cloths 1 Application(s) Topical daily  furosemide   Injectable 40 milliGRAM(s) IV Push daily  influenza  Vaccine (HIGH DOSE) 0.7 milliLiter(s) IntraMuscular once  losartan 25 milliGRAM(s) Oral daily  melatonin 5 milliGRAM(s) Oral at bedtime  metoprolol tartrate 12.5 milliGRAM(s) Oral two times a day  nystatin Powder 1 Application(s) Topical three times a day  quiNIDine gluconate  milliGRAM(s) Oral every 12 hours  sodium chloride 3%  Inhalation 4 milliLiter(s) Inhalation every 8 hours  warfarin 2.5 milliGRAM(s) Oral once        PAST MEDICAL & SURGICAL HISTORY:  Obese      Smoker      HTN (hypertension)      HLD (hyperlipidemia)      CAD (coronary artery disease)      CHF with cardiomyopathy      History of hip surgery          Vitals:  T(F): 98.1 (11-03), Max: 98.1 (11-03)  HR: 98 (11-04) (68 - 98)  BP: 102/68 (11-04) (102/68 - 137/71)  RR: 18 (11-04)  SpO2: 92% (11-04)  I&O's Summary    02 Nov 2023 07:01  -  03 Nov 2023 07:00  --------------------------------------------------------  IN: 840 mL / OUT: 850 mL / NET: -10 mL    03 Nov 2023 07:01  -  04 Nov 2023 05:45  --------------------------------------------------------  IN: 780 mL / OUT: 1000 mL / NET: -220 mL        Physical Exam:  Appearance: No acute distress; well appearing  Eyes: PERRL, EOMI, pink conjunctiva  HENT: Normal oral mucosa  Cardiovascular: RRR, S1, S2, no murmurs, rubs, or gallops; no edema; no JVD  Respiratory: Clear to auscultation bilaterally  Gastrointestinal: soft, non-tender, non-distended with normal bowel sounds  Musculoskeletal: No clubbing; no joint deformity   Neurologic: Non-focal  Lymphatic: No lymphadenopathy  Psychiatry: AAOx3, mood & affect appropriate  Skin: No rashes, ecchymoses, or cyanosis                          10.9   17.29 )-----------( 443      ( 03 Nov 2023 06:38 )             34.7     11-03    142  |  106  |  14  ----------------------------<  117<H>  4.0   |  27  |  0.50    Ca    8.6      03 Nov 2023 06:39  Mg     2.0     11-02      PT/INR - ( 03 Nov 2023 13:01 )   PT: 12.0 sec;   INR: 1.09 ratio         PTT - ( 02 Nov 2023 09:48 )  PTT:27.1 sec           TTE 10/31   1. Moderate pericardial effusion noted adjacent to the posterior left ventricle with no evidence of hemodynamic compromise.   2. Bilateral pleural effusion noted.   3. Compared to the transthoracic echocardiogram performed on 10/30/2023 there have been no significant interval changes.      TTE 11/2    1. Compared to the transthoracic echocardiogram performed on 10/31/2023 there have been no significant interval changes.   2. Trace pericardial effusion noted adjacent to the anterior right ventricle with no evidence of hemodynamic compromise.   denies pain/discomfort (Rating = 0)

## 2024-06-25 NOTE — BH INPATIENT PSYCHIATRY ASSESSMENT NOTE - NSBHMSEINTELL_PSY_A_CORE
Problem: PHYSICAL THERAPY ADULT  Goal: Performs mobility at highest level of function for planned discharge setting.  See evaluation for individualized goals.  Description: Treatment/Interventions: Functional transfer training, LE strengthening/ROM, Therapeutic exercise, Elevations, Endurance training, Cognitive reorientation, Patient/family training, Equipment eval/education, Bed mobility, Gait training, Spoke to nursing  Equipment Recommended: Walker       See flowsheet documentation for full assessment, interventions and recommendations.  Outcome: Progressing  Note: Prognosis: Fair  Problem List: Decreased strength, Decreased range of motion, Decreased endurance, Impaired balance, Decreased mobility, Decreased cognition, Impaired judgement, Decreased safety awareness, Impaired hearing (gait deviations)  Assessment: pt began tx session seated OOB in the recliner and was agreeable to participate in PT intervention. PT intervention to focus on transfer training, TE activities, posture/balance with gait and increasing activity tolerance. In comparison to previous PT interventions pt demonstrated progress with functional transfers as pt required /s and VC's for hand placement in order to complete multiple functional transfers w/o LOB. pt was able to increase activity tolerance and ambulation distance to 90'x1 RW with min ax1 for safety and balance as pt continues to demonstrate gait deviations. pt did participate in TE activities while seated in recliner in order to strengthen LE's in efforts in reducing risk for falls and injuries. Post tx session pt continues to demonstrate the following deficits: limited activity tolerance, ambulation distance and steps completed. pt would benefit frim continued skilled Pt intervention in order to address deficits listed above. Continue to recommend Dc w/ level 1 maximal rehab resource intensity when medically cleared  Barriers to Discharge: Inaccessible home environment,  Decreased caregiver support     Rehab Resource Intensity Level, PT: I (Maximum Resource Intensity)    See flowsheet documentation for full assessment.         Average

## 2024-12-16 NOTE — ED BEHAVIORAL HEALTH ASSESSMENT NOTE - LEGAL HISTORY
When did the latest symptoms begin?  Have the initial covid symptoms resolved?  Did she take the augmentin?  Does she have fever or shortness of breath?   none
